# Patient Record
Sex: MALE | Race: OTHER | NOT HISPANIC OR LATINO
[De-identification: names, ages, dates, MRNs, and addresses within clinical notes are randomized per-mention and may not be internally consistent; named-entity substitution may affect disease eponyms.]

---

## 2017-01-26 ENCOUNTER — APPOINTMENT (OUTPATIENT)
Dept: SURGICAL ONCOLOGY | Facility: CLINIC | Age: 67
End: 2017-01-26

## 2017-01-26 VITALS
DIASTOLIC BLOOD PRESSURE: 95 MMHG | BODY MASS INDEX: 25.61 KG/M2 | WEIGHT: 150 LBS | HEART RATE: 77 BPM | SYSTOLIC BLOOD PRESSURE: 165 MMHG | HEIGHT: 64 IN

## 2017-02-28 ENCOUNTER — OUTPATIENT (OUTPATIENT)
Dept: OUTPATIENT SERVICES | Facility: HOSPITAL | Age: 67
LOS: 1 days | Discharge: HOME | End: 2017-02-28

## 2017-02-28 DIAGNOSIS — D49.9 NEOPLASM OF UNSPECIFIED BEHAVIOR OF UNSPECIFIED SITE: ICD-10-CM

## 2017-03-02 ENCOUNTER — APPOINTMENT (OUTPATIENT)
Dept: HEMATOLOGY ONCOLOGY | Facility: CLINIC | Age: 67
End: 2017-03-02

## 2017-03-14 ENCOUNTER — FORM ENCOUNTER (OUTPATIENT)
Age: 67
End: 2017-03-14

## 2017-03-15 ENCOUNTER — OUTPATIENT (OUTPATIENT)
Dept: OUTPATIENT SERVICES | Facility: HOSPITAL | Age: 67
LOS: 1 days | End: 2017-03-15
Payer: COMMERCIAL

## 2017-03-15 ENCOUNTER — APPOINTMENT (OUTPATIENT)
Dept: CT IMAGING | Facility: IMAGING CENTER | Age: 67
End: 2017-03-15

## 2017-03-15 DIAGNOSIS — C49.A0 GASTROINTESTINAL STROMAL TUMOR, UNSPECIFIED SITE: ICD-10-CM

## 2017-03-15 PROCEDURE — 74177 CT ABD & PELVIS W/CONTRAST: CPT

## 2017-03-15 PROCEDURE — 82565 ASSAY OF CREATININE: CPT

## 2017-04-06 ENCOUNTER — OUTPATIENT (OUTPATIENT)
Dept: OUTPATIENT SERVICES | Facility: HOSPITAL | Age: 67
LOS: 1 days | Discharge: HOME | End: 2017-04-06

## 2017-04-06 DIAGNOSIS — D49.9 NEOPLASM OF UNSPECIFIED BEHAVIOR OF UNSPECIFIED SITE: ICD-10-CM

## 2017-04-10 ENCOUNTER — APPOINTMENT (OUTPATIENT)
Dept: HEMATOLOGY ONCOLOGY | Facility: CLINIC | Age: 67
End: 2017-04-10

## 2017-04-10 VITALS
DIASTOLIC BLOOD PRESSURE: 90 MMHG | HEART RATE: 96 BPM | BODY MASS INDEX: 26.49 KG/M2 | WEIGHT: 154.32 LBS | RESPIRATION RATE: 16 BRPM | SYSTOLIC BLOOD PRESSURE: 160 MMHG | TEMPERATURE: 98.1 F | OXYGEN SATURATION: 96 %

## 2017-04-18 ENCOUNTER — APPOINTMENT (OUTPATIENT)
Dept: UROLOGY | Facility: CLINIC | Age: 67
End: 2017-04-18

## 2017-04-18 DIAGNOSIS — E11.9 TYPE 2 DIABETES MELLITUS W/OUT COMPLICATIONS: ICD-10-CM

## 2017-06-19 ENCOUNTER — MEDICATION RENEWAL (OUTPATIENT)
Age: 67
End: 2017-06-19

## 2017-07-18 ENCOUNTER — APPOINTMENT (OUTPATIENT)
Dept: UROLOGY | Facility: CLINIC | Age: 67
End: 2017-07-18

## 2017-07-27 ENCOUNTER — APPOINTMENT (OUTPATIENT)
Dept: SURGICAL ONCOLOGY | Facility: CLINIC | Age: 67
End: 2017-07-27

## 2017-08-21 ENCOUNTER — MEDICATION RENEWAL (OUTPATIENT)
Age: 67
End: 2017-08-21

## 2018-06-28 ENCOUNTER — RX RENEWAL (OUTPATIENT)
Age: 68
End: 2018-06-28

## 2018-06-28 ENCOUNTER — MEDICATION RENEWAL (OUTPATIENT)
Age: 68
End: 2018-06-28

## 2018-08-07 ENCOUNTER — APPOINTMENT (OUTPATIENT)
Dept: UROLOGY | Facility: CLINIC | Age: 68
End: 2018-08-07
Payer: MEDICARE

## 2018-08-07 VITALS
HEART RATE: 91 BPM | DIASTOLIC BLOOD PRESSURE: 89 MMHG | RESPIRATION RATE: 16 BRPM | SYSTOLIC BLOOD PRESSURE: 137 MMHG | TEMPERATURE: 97.9 F

## 2018-08-07 PROCEDURE — 99213 OFFICE O/P EST LOW 20 MIN: CPT

## 2018-08-14 ENCOUNTER — RX RENEWAL (OUTPATIENT)
Age: 68
End: 2018-08-14

## 2018-10-15 ENCOUNTER — APPOINTMENT (OUTPATIENT)
Dept: SURGICAL ONCOLOGY | Facility: CLINIC | Age: 68
End: 2018-10-15

## 2018-10-25 ENCOUNTER — OUTPATIENT (OUTPATIENT)
Dept: OUTPATIENT SERVICES | Facility: HOSPITAL | Age: 68
LOS: 1 days | Discharge: HOME | End: 2018-10-25

## 2018-10-25 DIAGNOSIS — D49.9 NEOPLASM OF UNSPECIFIED BEHAVIOR OF UNSPECIFIED SITE: ICD-10-CM

## 2018-11-02 ENCOUNTER — APPOINTMENT (OUTPATIENT)
Dept: HEMATOLOGY ONCOLOGY | Facility: CLINIC | Age: 68
End: 2018-11-02

## 2019-01-04 ENCOUNTER — MEDICATION RENEWAL (OUTPATIENT)
Age: 69
End: 2019-01-04

## 2019-08-15 ENCOUNTER — RX RENEWAL (OUTPATIENT)
Age: 69
End: 2019-08-15

## 2019-09-01 ENCOUNTER — OUTPATIENT (OUTPATIENT)
Dept: OUTPATIENT SERVICES | Facility: HOSPITAL | Age: 69
LOS: 1 days | End: 2019-09-01
Payer: MEDICARE

## 2019-09-01 PROCEDURE — G9001: CPT

## 2019-09-10 ENCOUNTER — INPATIENT (INPATIENT)
Facility: HOSPITAL | Age: 69
LOS: 2 days | Discharge: HOME | End: 2019-09-13
Attending: HOSPITALIST | Admitting: HOSPITALIST
Payer: MEDICARE

## 2019-09-10 VITALS
RESPIRATION RATE: 16 BRPM | TEMPERATURE: 99 F | DIASTOLIC BLOOD PRESSURE: 88 MMHG | SYSTOLIC BLOOD PRESSURE: 152 MMHG | OXYGEN SATURATION: 99 % | HEART RATE: 85 BPM

## 2019-09-10 DIAGNOSIS — I63.9 CEREBRAL INFARCTION, UNSPECIFIED: ICD-10-CM

## 2019-09-10 LAB
ALBUMIN SERPL ELPH-MCNC: 4.5 G/DL — SIGNIFICANT CHANGE UP (ref 3.3–5)
ALP SERPL-CCNC: 81 U/L — SIGNIFICANT CHANGE UP (ref 40–120)
ALT FLD-CCNC: 19 U/L — SIGNIFICANT CHANGE UP (ref 4–41)
ANION GAP SERPL CALC-SCNC: 11 MMO/L — SIGNIFICANT CHANGE UP (ref 7–14)
APTT BLD: 31.2 SEC — SIGNIFICANT CHANGE UP (ref 27.5–36.3)
AST SERPL-CCNC: 16 U/L — SIGNIFICANT CHANGE UP (ref 4–40)
BASE EXCESS BLDV CALC-SCNC: 1.7 MMOL/L — SIGNIFICANT CHANGE UP
BASOPHILS # BLD AUTO: 0.04 K/UL — SIGNIFICANT CHANGE UP (ref 0–0.2)
BASOPHILS NFR BLD AUTO: 0.4 % — SIGNIFICANT CHANGE UP (ref 0–2)
BILIRUB SERPL-MCNC: 0.4 MG/DL — SIGNIFICANT CHANGE UP (ref 0.2–1.2)
BLOOD GAS VENOUS - CREATININE: 0.8 MG/DL — SIGNIFICANT CHANGE UP (ref 0.5–1.3)
BUN SERPL-MCNC: 23 MG/DL — SIGNIFICANT CHANGE UP (ref 7–23)
CALCIUM SERPL-MCNC: 9.5 MG/DL — SIGNIFICANT CHANGE UP (ref 8.4–10.5)
CHLORIDE BLDV-SCNC: 108 MMOL/L — SIGNIFICANT CHANGE UP (ref 96–108)
CHLORIDE SERPL-SCNC: 103 MMOL/L — SIGNIFICANT CHANGE UP (ref 98–107)
CK MB BLD-MCNC: 4.32 NG/ML — SIGNIFICANT CHANGE UP (ref 1–6.6)
CK SERPL-CCNC: 256 U/L — HIGH (ref 30–200)
CO2 SERPL-SCNC: 24 MMOL/L — SIGNIFICANT CHANGE UP (ref 22–31)
CREAT SERPL-MCNC: 0.93 MG/DL — SIGNIFICANT CHANGE UP (ref 0.5–1.3)
EOSINOPHIL # BLD AUTO: 0.06 K/UL — SIGNIFICANT CHANGE UP (ref 0–0.5)
EOSINOPHIL NFR BLD AUTO: 0.6 % — SIGNIFICANT CHANGE UP (ref 0–6)
GAS PNL BLDV: 135 MMOL/L — LOW (ref 136–146)
GLUCOSE BLDV-MCNC: 116 MG/DL — HIGH (ref 70–99)
GLUCOSE SERPL-MCNC: 116 MG/DL — HIGH (ref 70–99)
HCO3 BLDV-SCNC: 25 MMOL/L — SIGNIFICANT CHANGE UP (ref 20–27)
HCT VFR BLD CALC: 44.6 % — SIGNIFICANT CHANGE UP (ref 39–50)
HCT VFR BLDV CALC: 44.9 % — SIGNIFICANT CHANGE UP (ref 39–51)
HGB BLD-MCNC: 14.1 G/DL — SIGNIFICANT CHANGE UP (ref 13–17)
HGB BLDV-MCNC: 14.7 G/DL — SIGNIFICANT CHANGE UP (ref 13–17)
IMM GRANULOCYTES NFR BLD AUTO: 0.2 % — SIGNIFICANT CHANGE UP (ref 0–1.5)
INR BLD: 1.05 — SIGNIFICANT CHANGE UP (ref 0.88–1.17)
LACTATE BLDV-MCNC: 1.1 MMOL/L — SIGNIFICANT CHANGE UP (ref 0.5–2)
LYMPHOCYTES # BLD AUTO: 2.41 K/UL — SIGNIFICANT CHANGE UP (ref 1–3.3)
LYMPHOCYTES # BLD AUTO: 23.9 % — SIGNIFICANT CHANGE UP (ref 13–44)
MCHC RBC-ENTMCNC: 25.9 PG — LOW (ref 27–34)
MCHC RBC-ENTMCNC: 31.6 % — LOW (ref 32–36)
MCV RBC AUTO: 82 FL — SIGNIFICANT CHANGE UP (ref 80–100)
MONOCYTES # BLD AUTO: 0.82 K/UL — SIGNIFICANT CHANGE UP (ref 0–0.9)
MONOCYTES NFR BLD AUTO: 8.1 % — SIGNIFICANT CHANGE UP (ref 2–14)
NEUTROPHILS # BLD AUTO: 6.72 K/UL — SIGNIFICANT CHANGE UP (ref 1.8–7.4)
NEUTROPHILS NFR BLD AUTO: 66.8 % — SIGNIFICANT CHANGE UP (ref 43–77)
NRBC # FLD: 0 K/UL — SIGNIFICANT CHANGE UP (ref 0–0)
PCO2 BLDV: 44 MMHG — SIGNIFICANT CHANGE UP (ref 41–51)
PH BLDV: 7.39 PH — SIGNIFICANT CHANGE UP (ref 7.32–7.43)
PLATELET # BLD AUTO: 215 K/UL — SIGNIFICANT CHANGE UP (ref 150–400)
PMV BLD: 10.7 FL — SIGNIFICANT CHANGE UP (ref 7–13)
PO2 BLDV: 40 MMHG — SIGNIFICANT CHANGE UP (ref 35–40)
POTASSIUM BLDV-SCNC: 3.9 MMOL/L — SIGNIFICANT CHANGE UP (ref 3.4–4.5)
POTASSIUM SERPL-MCNC: 4.1 MMOL/L — SIGNIFICANT CHANGE UP (ref 3.5–5.3)
POTASSIUM SERPL-SCNC: 4.1 MMOL/L — SIGNIFICANT CHANGE UP (ref 3.5–5.3)
PROT SERPL-MCNC: 7.5 G/DL — SIGNIFICANT CHANGE UP (ref 6–8.3)
PROTHROM AB SERPL-ACNC: 11.7 SEC — SIGNIFICANT CHANGE UP (ref 9.8–13.1)
RBC # BLD: 5.44 M/UL — SIGNIFICANT CHANGE UP (ref 4.2–5.8)
RBC # FLD: 14.9 % — HIGH (ref 10.3–14.5)
SAO2 % BLDV: 70.1 % — SIGNIFICANT CHANGE UP (ref 60–85)
SODIUM SERPL-SCNC: 138 MMOL/L — SIGNIFICANT CHANGE UP (ref 135–145)
TROPONIN T, HIGH SENSITIVITY: 11 NG/L — SIGNIFICANT CHANGE UP (ref ?–14)
WBC # BLD: 10.07 K/UL — SIGNIFICANT CHANGE UP (ref 3.8–10.5)
WBC # FLD AUTO: 10.07 K/UL — SIGNIFICANT CHANGE UP (ref 3.8–10.5)

## 2019-09-10 PROCEDURE — 70450 CT HEAD/BRAIN W/O DYE: CPT | Mod: 26,59

## 2019-09-10 PROCEDURE — 71045 X-RAY EXAM CHEST 1 VIEW: CPT | Mod: 26

## 2019-09-10 PROCEDURE — 70498 CT ANGIOGRAPHY NECK: CPT | Mod: 26

## 2019-09-10 PROCEDURE — 70496 CT ANGIOGRAPHY HEAD: CPT | Mod: 26

## 2019-09-10 RX ORDER — HEPARIN SODIUM 5000 [USP'U]/ML
5000 INJECTION INTRAVENOUS; SUBCUTANEOUS THREE TIMES A DAY
Refills: 0 | Status: DISCONTINUED | OUTPATIENT
Start: 2019-09-10 | End: 2019-09-13

## 2019-09-10 RX ORDER — DEXTROSE 50 % IN WATER 50 %
25 SYRINGE (ML) INTRAVENOUS ONCE
Refills: 0 | Status: DISCONTINUED | OUTPATIENT
Start: 2019-09-10 | End: 2019-09-13

## 2019-09-10 RX ORDER — SODIUM CHLORIDE 9 MG/ML
1000 INJECTION, SOLUTION INTRAVENOUS
Refills: 0 | Status: DISCONTINUED | OUTPATIENT
Start: 2019-09-10 | End: 2019-09-13

## 2019-09-10 RX ORDER — INSULIN LISPRO 100/ML
VIAL (ML) SUBCUTANEOUS
Refills: 0 | Status: DISCONTINUED | OUTPATIENT
Start: 2019-09-10 | End: 2019-09-13

## 2019-09-10 RX ORDER — LOSARTAN POTASSIUM 100 MG/1
0 TABLET, FILM COATED ORAL
Qty: 90 | Refills: 0 | DISCHARGE

## 2019-09-10 RX ORDER — INSULIN LISPRO 100/ML
VIAL (ML) SUBCUTANEOUS AT BEDTIME
Refills: 0 | Status: DISCONTINUED | OUTPATIENT
Start: 2019-09-10 | End: 2019-09-13

## 2019-09-10 RX ORDER — DEXTROSE 50 % IN WATER 50 %
12.5 SYRINGE (ML) INTRAVENOUS ONCE
Refills: 0 | Status: DISCONTINUED | OUTPATIENT
Start: 2019-09-10 | End: 2019-09-13

## 2019-09-10 RX ORDER — ASPIRIN/CALCIUM CARB/MAGNESIUM 324 MG
325 TABLET ORAL ONCE
Refills: 0 | Status: COMPLETED | OUTPATIENT
Start: 2019-09-10 | End: 2019-09-10

## 2019-09-10 RX ORDER — DEXTROSE 50 % IN WATER 50 %
15 SYRINGE (ML) INTRAVENOUS ONCE
Refills: 0 | Status: DISCONTINUED | OUTPATIENT
Start: 2019-09-10 | End: 2019-09-13

## 2019-09-10 RX ORDER — GLUCAGON INJECTION, SOLUTION 0.5 MG/.1ML
1 INJECTION, SOLUTION SUBCUTANEOUS ONCE
Refills: 0 | Status: DISCONTINUED | OUTPATIENT
Start: 2019-09-10 | End: 2019-09-13

## 2019-09-10 RX ADMIN — Medication 325 MILLIGRAM(S): at 22:08

## 2019-09-10 NOTE — ED ADULT NURSE NOTE - OBJECTIVE STATEMENT
70 yo M AAOx4 received to [facility] as a Code Stroke: pt reports sudden onset dizziness while at work around 0430 and states he was "favoring his left side, felt like he was walking differently," denies new changes in vision (hx cataracts), no slurred speech or facial asymmetry observed, gait even and steady, b/l pupils reactive, b/l UE and LE strength/sensation intact, hx HTN, DM, triple bypass and "minor stroke a few years back" (unable to elaborate), 18G placed to RAC by SAMANTHA Han in [place], labs sent, pt placed on CM showing NSR, additional VS as charted, MD Cabot and neuro resident at bedside, will monitor

## 2019-09-10 NOTE — CONSULT NOTE ADULT - SUBJECTIVE AND OBJECTIVE BOX
Neurology  Consult Note  09-10-19    Name:  ASHLEE PEGUERO; 69y (1950)    Reason for Admission:     Chief Complaint:  HPI: 70yo Rt handed male with CABG, GI tumor s/p resection, Rt knee arthritis having ataxic gait since 2 days; having    Review of Systems:  As states in HPI.        PMHx:   Syncope and collapse  Neoplasm  Hypercholesterolemia  Vertigo  TIA (transient ischemic attack)  CAD (coronary artery disease)  Diabetes  Hypertension    PFHx:   Family history of heart disease (Aunt)  Family history of heart failure (Sibling)    PSuHx:   Gastric mass  History of appendectomy  S/P angioplasty  S/P CABG x 3    PSoHx:     Marital Status:  (   )    (   ) Single    (   )    (  )   Occupation:   Lives with: (  ) alone  (  ) children   (  ) spouse   (  ) parents  (  ) other  Recent Travel:     Substance Use (street drugs): (  ) never used  (  ) other:  Tobacco Usage:  (   ) never smoked   (   ) former smoker   (   ) current smoker  (     ) pack year  Alcohol Usage:  Sexual History:         Medications:  MEDICATIONS  (STANDING):    MEDICATIONS  (PRN):    Vitals:  T(C): 37 (09-10-19 @ 20:46), Max: 37 (09-10-19 @ 20:46)  HR: 85 (09-10-19 @ 21:43) (85 - 85)  BP: 196/100 (09-10-19 @ 21:43) (152/88 - 196/100)  RR: 18 (09-10-19 @ 21:43) (16 - 18)  SpO2: 100% (09-10-19 @ 21:43) (99% - 100%)    Labs:                        14.1   10.07 )-----------( 215      ( 10 Sep 2019 21:15 )             44.6     09-10    138  |  103  |  23  ----------------------------<  116<H>  4.1   |  24  |  0.93    Ca    9.5      10 Sep 2019 21:15    TPro  7.5  /  Alb  4.5  /  TBili  0.4  /  DBili  x   /  AST  16  /  ALT  19  /  AlkPhos  81  09-10    CAPILLARY BLOOD GLUCOSE  109 (10 Sep 2019 21:18)      POCT Blood Glucose.: 108 mg/dL (10 Sep 2019 21:53)    LIVER FUNCTIONS - ( 10 Sep 2019 21:15 )  Alb: 4.5 g/dL / Pro: 7.5 g/dL / ALK PHOS: 81 u/L / ALT: 19 u/L / AST: 16 u/L / GGT: x             PT/INR - ( 10 Sep 2019 21:15 )   PT: 11.7 SEC;   INR: 1.05          PTT - ( 10 Sep 2019 21:15 )  PTT:31.2 SEC  CSF:                      PHYSICAL EXAMINATION:  General: Well-developed, well nourished, in no acute distress.  Eyes: Conjunctiva and sclera clear.  Neurologic:  - Mental Status:  Alert, awake, oriented to person, place, and time; Speech is fluent with intact naming, repetition, and comprehension; Good overall fund of knowledge; Immediate recall is 3/3 words and delayed recall is 3/3 words at 5 minutes; Able to spell WORLD backwards and perform serial 7 subtraction; Able to read and write a sentence.  - Cranial Nerves II-XII:    II:  Visual fields are full to confrontation; Pupils are equal, round, and reactive to light; Visual acuity is 20/20 bilaterally; Fundoscopic exam is normal with sharp discs.  III, IV, VI:  Extraocular movements are intact without nystagmus.  V:  Facial sensation is intact in the V1-V3 distribution bilaterally.  VII:  Face is symmetric with normal eye closure and smile  VIII:  Hearing is intact to finger rub.  IX, X:  Uvula is midline and soft palate rises symmetrically  XI:  Head turning and shoulder shrug are intact.  XII:  Tongue protudes in the midline.  - Motor:  Strength is 5/5 throughout.  There is no pronator drift.  Normal muscle bulk and tone throughout.  - Reflexes:  2+ and symmetric at the biceps, triceps, brachioradialis, knees, and ankles.  Plantar responses flexor.  - Sensory:  Intact throughout to vibration, and joint-position, light touch, pin prick.  - Coordination:  Finger-nose-finger and heel-knee-shin intact without dysmetria.  Rapid alternating hand movements intact.  - Gait:   Normal steps, base, arm swing, and turning.  Heel and toe walking are normal.  Tandem gait is normal.  Romberg testing is negative.    Radiology:      Impression:      Plan:    Legend:  [] Uncomplete task.  [P] Ordered and pending task.  [R] Imaging done, pending read.    [x] Completed task. Neurology  Consult Note  09-10-19    Name:  ASHLEE PEGUERO; 69y (1950)    Reason for Admission:     Chief Complaint:  HPI: 68yo Rt handed male with CABG, GI tumor s/p resection, Rt knee arthritis having ataxic gait since 2 days; having difficulty walking since 2 days; increased since 430pm on 09.10.2019. patient was signing out from work when he suddenly felt as if he was bending towards left side. He slowly walked back home and called his son who brought him to the ED. No weakness endorsed by the patient.     ROS: + nausea, no vomiting, no seizures, no loss of vision, no LOC    Underwent cataract surgery recently.    Review of Systems:  As states in HPI.    Pertinent medical conditions: CAD s/p CABG 15 y ago, HFpEF s/p gastric mass resection in early June 2015, HTN, PreDM; urinary retention (BPH)    PMHx:   Syncope and collapse  Neoplasm  Hypercholesterolemia  Vertigo  TIA (transient ischemic attack)  CAD (coronary artery disease)  Diabetes  Hypertension    PFHx:   Family history of heart disease (Aunt)  Family history of heart failure (Sibling)    PSuHx:   Gastric mass  History of appendectomy  S/P angioplasty  S/P CABG x 3    PSoHx:     Marital Status:  (   )    (   ) Single    (   )    (  )   Occupation:   Lives with: (  ) alone  (  ) children   (  ) spouse   (  ) parents  (  ) other  Recent Travel:     Substance Use (street drugs): (  ) never used  (  ) other:  Tobacco Usage:  (   ) never smoked   (   ) former smoker   (   ) current smoker  (     ) pack year  Alcohol Usage:  Sexual History:         Medications:  MEDICATIONS  (STANDING):    MEDICATIONS  (PRN):    Vitals:  T(C): 37 (09-10-19 @ 20:46), Max: 37 (09-10-19 @ 20:46)  HR: 85 (09-10-19 @ 21:43) (85 - 85)  BP: 196/100 (09-10-19 @ 21:43) (152/88 - 196/100)  RR: 18 (09-10-19 @ 21:43) (16 - 18)  SpO2: 100% (09-10-19 @ 21:43) (99% - 100%)    Labs:                        14.1   10.07 )-----------( 215      ( 10 Sep 2019 21:15 )             44.6     09-10    138  |  103  |  23  ----------------------------<  116<H>  4.1   |  24  |  0.93    Ca    9.5      10 Sep 2019 21:15    TPro  7.5  /  Alb  4.5  /  TBili  0.4  /  DBili  x   /  AST  16  /  ALT  19  /  AlkPhos  81  09-10    CAPILLARY BLOOD GLUCOSE  109 (10 Sep 2019 21:18)      POCT Blood Glucose.: 108 mg/dL (10 Sep 2019 21:53)    LIVER FUNCTIONS - ( 10 Sep 2019 21:15 )  Alb: 4.5 g/dL / Pro: 7.5 g/dL / ALK PHOS: 81 u/L / ALT: 19 u/L / AST: 16 u/L / GGT: x             PT/INR - ( 10 Sep 2019 21:15 )   PT: 11.7 SEC;   INR: 1.05          PTT - ( 10 Sep 2019 21:15 )  PTT:31.2 SEC  CSF:                      PHYSICAL EXAMINATION:  General: in no acute distress.  Eyes: Conjunctiva and sclera clear.  Neurologic:  - Mental Status:  Alert, awake, oriented to person, place, and time; Speech is baseline for patient; with intact naming, repetition, and comprehension;  - Cranial Nerves II-XII:    II:  Visual fields are full to confrontation; Pupils are equal, round, and reactive to light; Visual acuity is 20/20 bilaterally;   III, IV, VI:  Extraocular movements are intact without nystagmus.  V:  Facial sensation is intact in the V1-V3 distribution bilaterally.  VII:  Mild rt nasolabial flattening  VIII:  Hearing is intact to finger rub.  IX, X:  Uvula is midline and soft palate rises symmetrically  XI:  Head turning and shoulder shrug are intact.  XII:  Tongue protudes in the midline.  - Motor:  Strength is 5/5 throughout (subtle weakness Rt UE and Rt LE).  Mild pronator drift Rt UE and Rt LE.  Normal muscle bulk and tone throughout.  - Reflexes:  2+ and symmetric at the biceps, triceps, brachioradialis, knees, and ankles.  Plantar responses flexor.  - Sensory:  Intact throughout to vibration, and joint-position, light touch, pin prick.  - Coordination:  Finger-nose-finger and heel-knee-shin intact without dysmetria.  Rapid alternating hand movements intact.  - Gait:   Normal steps, base, arm swing, and turning.  Heel and toe walking are normal.  Tandem gait is normal.  Romberg testing is negative.    CTA BRAIN: Patent intracranial circulation. No flow-limiting stenosis or occlusion.   CTA NECK: Patent cervical vasculature. No flow limiting stenosis or occlusion. Severe atherosclerotic disease involving the proximal right vertebral artery near the basilar tip.       Non-contrast CT head: No CT evidence of an acute territorial infarct. No acute intracranial bleeding, mass effect, or shift. Neurology  Consult Note  09-10-19    Name:  ASHLEE PEGUERO; 69y (1950)    Reason for Admission:     Chief Complaint:  HPI: 70yo Rt handed male with CABG, GI tumor s/p resection, Rt knee arthritis having antalgic gait since 2 days 2/2 rt knee arthritis; per the son patient has a baseline lt sided limp; symptoms increased since 430pm on 09.10.2019 when patient was signing out from work when he suddenly felt as if he was bending towards left side; sudden worsening of symptoms were attributed to stroke.  Patient slowly walked back home and called his son who brought him to the ED. No weakness endorsed by the patient.     ROS: + nausea, no vomiting, no seizures, no loss of vision, no LOC    Underwent cataract surgery recently.    Review of Systems:  As states in HPI.    Pertinent medical conditions: CAD s/p CABG 15 y ago, HFpEF s/p gastric mass resection in early June 2015, HTN, PreDM; urinary retention (BPH)    PMHx:   Syncope and collapse  Neoplasm  Hypercholesterolemia  Vertigo  TIA (transient ischemic attack)  CAD (coronary artery disease)  Diabetes  Hypertension    PFHx:   Family history of heart disease (Aunt)  Family history of heart failure (Sibling)    PSuHx:   Gastric mass  History of appendectomy  S/P angioplasty  S/P CABG x 3    Medications:  MEDICATIONS  (STANDING):    MEDICATIONS  (PRN):    Vitals:  T(C): 37 (09-10-19 @ 20:46), Max: 37 (09-10-19 @ 20:46)  HR: 85 (09-10-19 @ 21:43) (85 - 85)  BP: 196/100 (09-10-19 @ 21:43) (152/88 - 196/100)  RR: 18 (09-10-19 @ 21:43) (16 - 18)  SpO2: 100% (09-10-19 @ 21:43) (99% - 100%)    Labs:                        14.1   10.07 )-----------( 215      ( 10 Sep 2019 21:15 )             44.6     09-10    138  |  103  |  23  ----------------------------<  116<H>  4.1   |  24  |  0.93    Ca    9.5      10 Sep 2019 21:15    TPro  7.5  /  Alb  4.5  /  TBili  0.4  /  DBili  x   /  AST  16  /  ALT  19  /  AlkPhos  81  09-10    CAPILLARY BLOOD GLUCOSE  109 (10 Sep 2019 21:18)      POCT Blood Glucose.: 108 mg/dL (10 Sep 2019 21:53)    LIVER FUNCTIONS - ( 10 Sep 2019 21:15 )  Alb: 4.5 g/dL / Pro: 7.5 g/dL / ALK PHOS: 81 u/L / ALT: 19 u/L / AST: 16 u/L / GGT: x             PT/INR - ( 10 Sep 2019 21:15 )   PT: 11.7 SEC;   INR: 1.05          PTT - ( 10 Sep 2019 21:15 )  PTT:31.2 SEC  CSF:      PHYSICAL EXAMINATION:  General: in no acute distress.  Eyes: Conjunctiva and sclera clear.  Neurologic:  - Mental Status:  Alert, awake, oriented to person, place, and time; Speech is baseline for patient; with intact naming, repetition, and comprehension;  - Cranial Nerves II-XII:    II:  Visual fields are full to confrontation; Pupils are equal, round, and reactive to light; Visual acuity is 20/20 bilaterally;   III, IV, VI:  Extraocular movements are intact without nystagmus.  V:  Facial sensation is intact in the V1-V3 distribution bilaterally.  VII:  Mild rt nasolabial flattening  VIII:  Hearing is intact to finger rub.  IX, X:  Uvula is midline and soft palate rises symmetrically  XI:  Head turning and shoulder shrug are intact.  XII:  Tongue protudes in the midline.  - Motor:  Strength is 5/5 throughout (subtle weakness Rt UE and Rt LE).  Mild pronator drift Rt UE and Rt LE.  Normal muscle bulk and tone throughout.  - Reflexes:  2+ and symmetric at the biceps, triceps, brachioradialis, knees, and ankles.  Plantar responses flexor.  - Sensory:  Intact throughout to vibration, and joint-position, light touch, pin prick.  - Coordination:  Finger-nose-finger and heel-knee-shin intact without dysmetria.  Rapid alternating hand movements intact.  - Gait:   Normal steps, base, arm swing, and turning.  Heel and toe walking are normal.  Tandem gait is normal.  Romberg testing is negative.    CTA BRAIN: Patent intracranial circulation. No flow-limiting stenosis or occlusion.   CTA NECK: Patent cervical vasculature. No flow limiting stenosis or occlusion. Severe atherosclerotic disease involving the proximal right vertebral artery near the basilar tip.       Non-contrast CT head: No CT evidence of an acute territorial infarct. No acute intracranial bleeding, mass effect, or shift.

## 2019-09-10 NOTE — ED ADULT TRIAGE NOTE - CHIEF COMPLAINT QUOTE
Pt st" I can't walk feel like I will fall over...I don't feel dizzy  and I can't hold pen in rt hand ."

## 2019-09-10 NOTE — CHART NOTE - NSCHARTNOTEFT_GEN_A_CORE
Resident: Enzo Kunz - Called by Triage RN to eval patient for possible code stroke. Pt states has hx of cardiac stents. 4 hours ago says that started having difficulty walking. He describes difficulty as feeling like he is falling over to the side. Gross strength and sensation intact in UE and LE. When attempting to ambulate pt appears to have difficulty balancing and moving his right leg. Pt states that he ambulates normally without difficulty. Advised to call code stroke.

## 2019-09-10 NOTE — ED PROVIDER NOTE - ATTENDING CONTRIBUTION TO CARE
I, Jennifer Cabot, MD, have performed a history and physical exam of the patient and discussed their management with the resident. I reviewed the resident's note and agree with the documented findings and plan of care. My medical decision making and observations are found above.    Cabot: 69M with PMH of HTN, HL, DM, CAD s/p quad CABG, prior silent CVAs, here with 2d of R hand weakness and 5 hours of ataxia.  No F/C/N/V/D/urinary sx/CP/SOB.  HDS, NAD, MMM, eyes clear, lungs CTAB, heart sounds normal, abd soft, NT, ND, no CVAT, LEs without edema, wwp, skin normal temperature and color, neuro: AAOx3, PERRLA, EOMIB, CN 2-12 intact, 4/5 R hand  strength and RLE strength, SILT, + RUE drift, ataxic gait, falling to R.  Very likely CVA.  Will admit for MRI brain and further workup.

## 2019-09-10 NOTE — ED PROVIDER NOTE - CLINICAL SUMMARY MEDICAL DECISION MAKING FREE TEXT BOX
Cabot: 69M with PMH of HTN, HL, DM, CAD s/p quad CABG, prior silent CVAs, here with 2d of R hand weakness and 5 hours of ataxia.  No F/C/N/V/D/urinary sx/CP/SOB.  HDS, NAD, MMM, eyes clear, lungs CTAB, heart sounds normal, abd soft, NT, ND, no CVAT, LEs without edema, wwp, skin normal temperature and color, neuro: AAOx3, PERRLA, EOMIB, CN 2-12 intact, 4/5 R hand  strength and RLE strength, SILT, + RUE drift, ataxic gait, falling to R.  Very likely CVA.  Will admit for MRI brain and further workup.

## 2019-09-10 NOTE — ED PROVIDER NOTE - PHYSICAL EXAMINATION
HDS, NAD, MMM, eyes clear, lungs CTAB, heart sounds normal, abd soft, NT, ND, no CVAT, LEs without edema, wwp, skin normal temperature and color, neuro: AAOx3, PERRLA, EOMIB, CN 2-12 intact, 4/5 R hand  strength and RLE strength, SILT, + RUE drift, ataxic gait, falling to R

## 2019-09-10 NOTE — ED PROVIDER NOTE - OBJECTIVE STATEMENT
Cabot: 69M with PMH of HTN, HL, DM, CAD s/p quad CABG, prior silent CVAs, here with 2d of R hand weakness and 5 hours of ataxia.  No F/C/N/V/D/urinary sx/CP/SOB. Cabot: 69M with PMH of HTN, HL, DM, CAD s/p quad CABG, prior silent CVAs, here with 2d of R hand weakness and 5 hours of ataxia.  No F/C/N/V/D/urinary sx/CP/SOB.    PCP: Sai  Cardiologist: Dr. Gleason

## 2019-09-10 NOTE — ED PROVIDER NOTE - PMH
CAD (coronary artery disease)    Diabetes    Hypercholesterolemia    Hypertension    Neoplasm  abdominal  Syncope and collapse  6/2015  TIA (transient ischemic attack)  March 2015- was admitted at [facility]

## 2019-09-10 NOTE — ED PROVIDER NOTE - PROGRESS NOTE DETAILS
PGY2/MD Makeda. No new symotoms. CT negative to bleeding, MRI recommended. pt stands up, urinating with no off balance. no chest pain or sob. TIA vs. stoke. aspirin PO at this moment, no heparin recommended from neuro. admission to hospitalist, accepted.

## 2019-09-10 NOTE — CONSULT NOTE ADULT - ASSESSMENT
68yo Rt handed male with CABG, GI tumor s/p resection, Rt knee arthritis having ataxic gait since 2 days; symptomatic worsening since 430pm on 09.10.2019. Last known normal was 2 days ago. Associated nausea; no weakness; other ROS are negative; neurologic exam reveals mild pronator drift Rt UE and LE, rt nasolabial flattening and subtle decrease in strength Rt UE; no cerebellar signs; rhomberg neg; symptoms resolved at this point; does not endorse weakness anywhere; NCCT head reveals previous lacunar infarcts; atherosclerotic disease noted on CT angio however no significant stenosis. NIHSS3; MRS 0    Impression: Lt sided stroke; 68yo Rt handed male with CABG, GI tumor s/p resection, Rt knee arthritis having ataxic gait since 2 days; symptomatic worsening since 430pm on 09.10.2019. Last known normal was 2 days ago. Associated nausea; no weakness; other ROS are negative; neurologic exam reveals mild pronator drift Rt UE and LE, rt nasolabial flattening and subtle decrease in strength Rt UE; no cerebellar signs; rhomberg neg; symptoms resolved at this point; does not endorse weakness anywhere; NCCT head reveals previous lacunar infarcts; atherosclerotic disease noted on CT angio however no significant stenosis. NIHSS3; MRS 0    Impression: Lt sided stroke;     Recommendations:  - MRI without contrast brain  - Permissive HTN  - Continue current aspirin dose 81mg daily  - PT/OT 70yo Rt handed male with CABG, GI tumor s/p resection, Rt knee arthritis having ataxic gait since 2 days; symptomatic worsening since 430pm on 09.10.2019. Last known normal was 2 days ago. Associated nausea; no weakness; other ROS are negative; neurologic exam reveals mild pronator drift Rt UE and LE, rt nasolabial flattening and subtle decrease in strength Rt UE; no cerebellar signs; rhomberg neg; symptoms resolved at this point; does not endorse weakness anywhere; NCCT head reveals previous lacunar infarcts; severe atherosclerosis noted tip of rt basilar artery. 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 Last known normal was 2 days ago. Associated nausea; no weakness; other ROS are negative; neurologic exam reveals mild pronator drift Rt UE and LE, rt nasolabial flattening and subtle decrease in strength Rt UE; no cerebellar signs; rhomberg neg; symptoms resolved at this point; does not endorse weakness anywhere; NCCT head reveals previous lacunar infarcts; severe atherosclerosis noted tip of rt basilar artery. NIHSS3; MRS 0    Impression: R Hemiparesis, Pure motor lacunar syndrome, likely L hemispheric dysfunction, etiology unknown but maybe small vessel disease    []  Aspirin 81   []  Atorvastatin 80, titrate to LDL<70  []  MRI brain w/o contrast   []  TTE with bubble study and telemetry to look for a cardiac source of embolism.   []  DVT prophylaxis   []  Telemonitoring;  Neurochecks q4h  []  Permissive HTN up to 220/120 mmHg for first 24 hours after the symptom onset followed by gradual normotension.   []  HbA1C and Lipid Panel  []  PT/OT evaluation  []  NPO until clears Dysphagia screen, otherwise Swallow evaluation  []  Stroke education provided 68yo Rt handed male with CABG, GI tumor s/p resection, Rt knee arthritis having abnormal gait since 2 days; symptomatic worsening since 430pm on 09.10.2019. Last known normal was 2 days ago. Associated nausea; no weakness; other ROS are negative; neurologic exam reveals mild pronator drift Rt UE and LE, rt nasolabial flattening and subtle decrease in strength Rt UE; no cerebellar signs; rhomberg neg; does not endorse weakness anywhere; NCCT head reveals previous lacunar infarcts; severe atherosclerosis noted in proximal vertebral artery at the tip of basilar. Currently outside the window for TPA. NIHSS3; MRS 0    Impression: R Hemiparesis, Pure motor lacunar syndrome, likely L hemispheric dysfunction, etiology unknown but maybe small vessel disease    []  Aspirin 81   []  Atorvastatin 80, titrate to LDL<70  []  MRI brain w/o contrast   []  TTE with bubble study and telemetry to look for a cardiac source of embolism.   []  DVT prophylaxis   []  Telemonitoring;  Neurochecks q4h  []  Permissive HTN up to 220/120 mmHg for first 24 hours after the symptom onset followed by gradual normotension.   []  HbA1C and Lipid Panel  []  PT/OT evaluation  []  NPO until clears Dysphagia screen, otherwise Swallow evaluation  []  Stroke education provided 70yo Rt handed male with CABG, GI tumor s/p resection, Rt knee arthritis having abnormal gait since 2 days; symptomatic worsening since 430pm on 09.10.2019. Last known normal was 2 days ago. Associated nausea; no weakness; other ROS are negative; neurologic exam reveals mild pronator drift Rt UE and LE, rt nasolabial flattening and subtle decrease in strength Rt UE; no cerebellar signs; rhomberg neg; does not endorse weakness anywhere; NCCT head reveals previous lacunar infarcts; severe atherosclerosis noted in proximal vertebral artery at the tip of basilar. Currently outside the window for TPA. NIHSS3; MRS 0    Impression: R Hemiparesis,  likely L brain dysfunction, etiology unknown but maybe small vessel disease    []  Aspirin 81   []  Atorvastatin 80, titrate to LDL<70  []  MRI brain w/o contrast   []  TTE with bubble study and telemetry to look for a cardiac source of embolism.   []  DVT prophylaxis   []  Telemonitoring;  Neurochecks q4h  []  Permissive HTN up to 220/120 mmHg for first 24 hours after the symptom onset followed by gradual normotension.   []  HbA1C and Lipid Panel  []  PT/OT evaluation  []  NPO until clears Dysphagia screen, otherwise Swallow evaluation  []  Stroke education provided

## 2019-09-11 DIAGNOSIS — Z29.9 ENCOUNTER FOR PROPHYLACTIC MEASURES, UNSPECIFIED: ICD-10-CM

## 2019-09-11 DIAGNOSIS — E11.9 TYPE 2 DIABETES MELLITUS WITHOUT COMPLICATIONS: ICD-10-CM

## 2019-09-11 DIAGNOSIS — E78.00 PURE HYPERCHOLESTEROLEMIA, UNSPECIFIED: ICD-10-CM

## 2019-09-11 DIAGNOSIS — I25.10 ATHEROSCLEROTIC HEART DISEASE OF NATIVE CORONARY ARTERY WITHOUT ANGINA PECTORIS: ICD-10-CM

## 2019-09-11 DIAGNOSIS — I63.9 CEREBRAL INFARCTION, UNSPECIFIED: ICD-10-CM

## 2019-09-11 DIAGNOSIS — I10 ESSENTIAL (PRIMARY) HYPERTENSION: ICD-10-CM

## 2019-09-11 LAB
ANION GAP SERPL CALC-SCNC: 14 MMO/L — SIGNIFICANT CHANGE UP (ref 7–14)
BASOPHILS # BLD AUTO: 0.02 K/UL — SIGNIFICANT CHANGE UP (ref 0–0.2)
BASOPHILS NFR BLD AUTO: 0.3 % — SIGNIFICANT CHANGE UP (ref 0–2)
BLD GP AB SCN SERPL QL: NEGATIVE — SIGNIFICANT CHANGE UP
BUN SERPL-MCNC: 15 MG/DL — SIGNIFICANT CHANGE UP (ref 7–23)
CALCIUM SERPL-MCNC: 9.3 MG/DL — SIGNIFICANT CHANGE UP (ref 8.4–10.5)
CHLORIDE SERPL-SCNC: 101 MMOL/L — SIGNIFICANT CHANGE UP (ref 98–107)
CHOLEST SERPL-MCNC: 138 MG/DL — SIGNIFICANT CHANGE UP (ref 120–199)
CHOLEST SERPL-MCNC: 139 MG/DL — SIGNIFICANT CHANGE UP (ref 120–199)
CK MB BLD-MCNC: 0.6 — SIGNIFICANT CHANGE UP (ref 0–2.5)
CK MB BLD-MCNC: 3.44 NG/ML — SIGNIFICANT CHANGE UP (ref 1–6.6)
CK SERPL-CCNC: 610 U/L — HIGH (ref 30–200)
CO2 SERPL-SCNC: 27 MMOL/L — SIGNIFICANT CHANGE UP (ref 22–31)
CREAT SERPL-MCNC: 0.77 MG/DL — SIGNIFICANT CHANGE UP (ref 0.5–1.3)
EOSINOPHIL # BLD AUTO: 0.06 K/UL — SIGNIFICANT CHANGE UP (ref 0–0.5)
EOSINOPHIL NFR BLD AUTO: 0.9 % — SIGNIFICANT CHANGE UP (ref 0–6)
GLUCOSE BLDC GLUCOMTR-MCNC: 103 MG/DL — HIGH (ref 70–99)
GLUCOSE BLDC GLUCOMTR-MCNC: 105 MG/DL — HIGH (ref 70–99)
GLUCOSE BLDC GLUCOMTR-MCNC: 89 MG/DL — SIGNIFICANT CHANGE UP (ref 70–99)
GLUCOSE SERPL-MCNC: 123 MG/DL — HIGH (ref 70–99)
HBA1C BLD-MCNC: 6.3 % — HIGH (ref 4–5.6)
HBA1C BLD-MCNC: 6.4 % — HIGH (ref 4–5.6)
HCT VFR BLD CALC: 45.9 % — SIGNIFICANT CHANGE UP (ref 39–50)
HCV AB S/CO SERPL IA: 0.17 S/CO — SIGNIFICANT CHANGE UP (ref 0–0.99)
HCV AB SERPL-IMP: SIGNIFICANT CHANGE UP
HDLC SERPL-MCNC: 74 MG/DL — HIGH (ref 35–55)
HDLC SERPL-MCNC: 74 MG/DL — HIGH (ref 35–55)
HGB BLD-MCNC: 14.2 G/DL — SIGNIFICANT CHANGE UP (ref 13–17)
IMM GRANULOCYTES NFR BLD AUTO: 0.3 % — SIGNIFICANT CHANGE UP (ref 0–1.5)
LIPID PNL WITH DIRECT LDL SERPL: 57 MG/DL — SIGNIFICANT CHANGE UP
LIPID PNL WITH DIRECT LDL SERPL: 63 MG/DL — SIGNIFICANT CHANGE UP
LYMPHOCYTES # BLD AUTO: 1.9 K/UL — SIGNIFICANT CHANGE UP (ref 1–3.3)
LYMPHOCYTES # BLD AUTO: 29.3 % — SIGNIFICANT CHANGE UP (ref 13–44)
MAGNESIUM SERPL-MCNC: 2 MG/DL — SIGNIFICANT CHANGE UP (ref 1.6–2.6)
MCHC RBC-ENTMCNC: 25.2 PG — LOW (ref 27–34)
MCHC RBC-ENTMCNC: 30.9 % — LOW (ref 32–36)
MCV RBC AUTO: 81.4 FL — SIGNIFICANT CHANGE UP (ref 80–100)
MONOCYTES # BLD AUTO: 0.56 K/UL — SIGNIFICANT CHANGE UP (ref 0–0.9)
MONOCYTES NFR BLD AUTO: 8.6 % — SIGNIFICANT CHANGE UP (ref 2–14)
NEUTROPHILS # BLD AUTO: 3.93 K/UL — SIGNIFICANT CHANGE UP (ref 1.8–7.4)
NEUTROPHILS NFR BLD AUTO: 60.6 % — SIGNIFICANT CHANGE UP (ref 43–77)
NRBC # FLD: 0 K/UL — SIGNIFICANT CHANGE UP (ref 0–0)
PHOSPHATE SERPL-MCNC: 3.7 MG/DL — SIGNIFICANT CHANGE UP (ref 2.5–4.5)
PLATELET # BLD AUTO: 211 K/UL — SIGNIFICANT CHANGE UP (ref 150–400)
PMV BLD: 10.7 FL — SIGNIFICANT CHANGE UP (ref 7–13)
POTASSIUM SERPL-MCNC: 4.1 MMOL/L — SIGNIFICANT CHANGE UP (ref 3.5–5.3)
POTASSIUM SERPL-SCNC: 4.1 MMOL/L — SIGNIFICANT CHANGE UP (ref 3.5–5.3)
RBC # BLD: 5.64 M/UL — SIGNIFICANT CHANGE UP (ref 4.2–5.8)
RBC # FLD: 14.9 % — HIGH (ref 10.3–14.5)
RH IG SCN BLD-IMP: POSITIVE — SIGNIFICANT CHANGE UP
SODIUM SERPL-SCNC: 142 MMOL/L — SIGNIFICANT CHANGE UP (ref 135–145)
TRIGL SERPL-MCNC: 54 MG/DL — SIGNIFICANT CHANGE UP (ref 10–149)
TRIGL SERPL-MCNC: 58 MG/DL — SIGNIFICANT CHANGE UP (ref 10–149)
TROPONIN T, HIGH SENSITIVITY: 12 NG/L — SIGNIFICANT CHANGE UP (ref ?–14)
WBC # BLD: 6.49 K/UL — SIGNIFICANT CHANGE UP (ref 3.8–10.5)
WBC # FLD AUTO: 6.49 K/UL — SIGNIFICANT CHANGE UP (ref 3.8–10.5)

## 2019-09-11 PROCEDURE — 33285 INSJ SUBQ CAR RHYTHM MNTR: CPT

## 2019-09-11 PROCEDURE — 99222 1ST HOSP IP/OBS MODERATE 55: CPT

## 2019-09-11 PROCEDURE — 70551 MRI BRAIN STEM W/O DYE: CPT | Mod: 26

## 2019-09-11 PROCEDURE — 12345: CPT | Mod: NC

## 2019-09-11 PROCEDURE — 99223 1ST HOSP IP/OBS HIGH 75: CPT

## 2019-09-11 PROCEDURE — 93306 TTE W/DOPPLER COMPLETE: CPT | Mod: 26

## 2019-09-11 RX ORDER — ASPIRIN/CALCIUM CARB/MAGNESIUM 324 MG
81 TABLET ORAL DAILY
Refills: 0 | Status: DISCONTINUED | OUTPATIENT
Start: 2019-09-10 | End: 2019-09-13

## 2019-09-11 RX ORDER — TAMSULOSIN HYDROCHLORIDE 0.4 MG/1
0.4 CAPSULE ORAL AT BEDTIME
Refills: 0 | Status: DISCONTINUED | OUTPATIENT
Start: 2019-09-10 | End: 2019-09-13

## 2019-09-11 RX ORDER — FINASTERIDE 5 MG/1
5 TABLET, FILM COATED ORAL DAILY
Refills: 0 | Status: DISCONTINUED | OUTPATIENT
Start: 2019-09-10 | End: 2019-09-13

## 2019-09-11 RX ORDER — INFLUENZA VIRUS VACCINE 15; 15; 15; 15 UG/.5ML; UG/.5ML; UG/.5ML; UG/.5ML
0.5 SUSPENSION INTRAMUSCULAR ONCE
Refills: 0 | Status: COMPLETED | OUTPATIENT
Start: 2019-09-11 | End: 2019-09-11

## 2019-09-11 RX ORDER — PANTOPRAZOLE SODIUM 20 MG/1
40 TABLET, DELAYED RELEASE ORAL
Refills: 0 | Status: DISCONTINUED | OUTPATIENT
Start: 2019-09-11 | End: 2019-09-13

## 2019-09-11 RX ORDER — ATORVASTATIN CALCIUM 80 MG/1
80 TABLET, FILM COATED ORAL AT BEDTIME
Refills: 0 | Status: DISCONTINUED | OUTPATIENT
Start: 2019-09-10 | End: 2019-09-13

## 2019-09-11 RX ADMIN — TAMSULOSIN HYDROCHLORIDE 0.4 MILLIGRAM(S): 0.4 CAPSULE ORAL at 22:34

## 2019-09-11 RX ADMIN — PANTOPRAZOLE SODIUM 40 MILLIGRAM(S): 20 TABLET, DELAYED RELEASE ORAL at 09:04

## 2019-09-11 RX ADMIN — FINASTERIDE 5 MILLIGRAM(S): 5 TABLET, FILM COATED ORAL at 11:43

## 2019-09-11 RX ADMIN — ATORVASTATIN CALCIUM 80 MILLIGRAM(S): 80 TABLET, FILM COATED ORAL at 22:34

## 2019-09-11 RX ADMIN — HEPARIN SODIUM 5000 UNIT(S): 5000 INJECTION INTRAVENOUS; SUBCUTANEOUS at 06:05

## 2019-09-11 RX ADMIN — HEPARIN SODIUM 5000 UNIT(S): 5000 INJECTION INTRAVENOUS; SUBCUTANEOUS at 14:30

## 2019-09-11 RX ADMIN — Medication 81 MILLIGRAM(S): at 11:43

## 2019-09-11 RX ADMIN — HEPARIN SODIUM 5000 UNIT(S): 5000 INJECTION INTRAVENOUS; SUBCUTANEOUS at 22:34

## 2019-09-11 NOTE — OCCUPATIONAL THERAPY INITIAL EVALUATION ADULT - PLANNED THERAPY INTERVENTIONS, OT EVAL
balance training/ADL retraining/fine motor coordination training/bed mobility training/transfer training/ROM/strengthening/neuromuscular re-education

## 2019-09-11 NOTE — H&P ADULT - NSHPLABSRESULTS_GEN_ALL_CORE
14.1   10.07 )-----------( 215      ( 10 Sep 2019 21:15 )             44.6   09-10    138  |  103  |  23  ----------------------------<  116<H>  4.1   |  24  |  0.93    Ca    9.5      10 Sep 2019 21:15    TPro  7.5  /  Alb  4.5  /  TBili  0.4  /  DBili  x   /  AST  16  /  ALT  19  /  AlkPhos  81  09-10    CTA Head and NECK: IMPRESSION:   CTA BRAIN: Patent intracranial circulation. No flow-limiting stenosis or   occlusion.   CTA NECK: Patent cervical vasculature. No flow limiting stenosis or   occlusion. Severe atherosclerotic disease involving the proximal right   vertebral artery near the basilar tip.     CT HEAD: IMPRESSION:   No CT evidence of an acute territorial infarct.  No acute intracranial bleeding, mass effect, or shift.   MRI can be performed for more sensitive evaluation if clinically   warranted.    Chest Xray: ******PRELIMINARY REPORT******        INTERPRETATION:  Clear lungs.

## 2019-09-11 NOTE — CONSULT NOTE ADULT - ATTENDING COMMENTS
Advanced care planning was discussed with patient and family.  Risks, benefits and alternatives of the cardiac treatments and medical therapy including procedures were discussed in detail and all questions were answered. Importance of compliance with medical therapy and lifestyle modification to improve cardiovascular health were addressed. Appropriate forms and patient educational materials were reviewed. 30 minutes face to face spent.
Patient seen and examined with resident. Agree with note and plan above.
69-year-old right-handed gentleman first evaluated at [facility] on 9/11/19 with gait difficulty and right hemiparesis. For several months prior to admission, he has had some gait disturbance, perhaps imbalance, for which he has been referred to a neurologist. On 9/10/19, he developed acute, much more severe gait difficulty and he had difficulty holding objects in his right hand. Medications at home included aspirin and simvastatin 40 mg daily. ROS otherwise negative. Exam. Alert and attentive; trace flattening of right nasolabial fold; no drift and power within normal limits throughout; gait not tested; remainder of neurologic exam was nonfocal. CT head (9/10/19) to my eye showed chronic lacunar infarcts: Right anterior limb of internal capsule, left thalamus, right corona radiata, and possibly a larger area of chronic ischemic change in the left centrum semiovale. CTA neck and head (9/10/19) to my eye showed severe stenosis of the intracranial right vertebral artery, and multifocal, intracranial atherosclerotic calcifications without significant stenosis. MRI brain (9/11/19) to my eye showed an acute small left MCA infarct in the pre-rolandic region. Impression. For a number of months, he has had mild difficulty with his balance. On 9/10/19, he developed acute, severe gait difficulty and probably at least mild right hemiparesis. His presentation was apparently due to an acute small left MCA cortical infarct. Mechanism is unknown and is consistent with embolic stroke of unknown source. He does have diffuse intracranial atherosclerosis but there is no significant stenosis other than the right vertebral artery which is an incidental finding. Suggest. TTE; implantable cardiac loop recorder; aspirin 81 mg daily-indefinitely; Plavix 300 mg loading dose, then 75 mg daily for 3 weeks; consider changing simvastatin 40 mg daily to atorvastatin 80 mg daily; PT/OT/PM&R.

## 2019-09-11 NOTE — H&P ADULT - NSICDXPASTMEDICALHX_GEN_ALL_CORE_FT
PAST MEDICAL HISTORY:  CAD (coronary artery disease)     Diabetes     Hypercholesterolemia     Hypertension     Neoplasm abdominal    Syncope and collapse 6/2015    TIA (transient ischemic attack) March 2015- was admitted at [facility]

## 2019-09-11 NOTE — CONSULT NOTE ADULT - SUBJECTIVE AND OBJECTIVE BOX
69y Male was admitted on 09-10    Patient is a 69y old  Male who presents with a chief complaint of Ataxia (11 Sep 2019 06:51)    HPI:  68 y/o Male with PMHx of HTN, HLD, DM,  ?arthritis, GI tumor s/p resection ,CAD s/p quad CABG, prior silent CVAs, TIA(2015)  presenting to [facility] ed on 9/10 with Ataxia and right hand weakness. Pt states that he noticed right second and third digit weakness x2 days PTA. On 9/10 4:30PM after finishing work, Pt was going down on stairs when he noticed that he was could not move his LLE and felt like he was dragging the left leg, which caused him to fall up to 2 stairs. No LOC or notable trauma endorsed.  NIHSS3; MRS 0. CT Head showing previous lacunar infarcts but negative for acute infarct or hemorrhage. CTA brain/ neck negative for stenosis but with severe atherosclerotic disease involving the proximal right vertebral artery near the basilar tip. Patient was not given TPA as out of window. MRI brain prelim report as per neuro with suspected acute small left MCA infarct.  Patient had dysphagia screen, currently on dysphagia 1 diet pureed/honey.  Pending TTE    TODAY'S SUBJECTIVE HX:    REVIEW OF SYSTEMS  Constitutional - No fever, No weight loss, No fatigue  HEENT - No eye pain, No visual disturbances, No difficulty hearing, No tinnitus, No vertigo, No neck pain  Respiratory - No cough, No wheezing, No shortness of breath  Cardiovascular - No chest pain, No palpitations  Gastrointestinal - No abdominal pain, No nausea, No vomiting, No diarrhea, No constipation  Genitourinary - No dysuria, No frequency, No hematuria, No incontinence  Neurological - No headaches, No memory loss, No loss of strength, No numbness, No tremors  Skin - No itching, No rashes, No lesions   Endocrine - No temperature intolerance  Musculoskeletal - No joint pain, No joint swelling, No muscle pain  Psychiatric - No depression, No anxiety    VITALS  T(C): 36.6 (09-11-19 @ 09:01), Max: 37 (09-10-19 @ 20:46)  HR: 79 (09-11-19 @ 09:01) (74 - 85)  BP: 160/98 (09-11-19 @ 09:01) (151/90 - 196/100)  RR: 17 (09-11-19 @ 09:01) (16 - 19)  SpO2: 100% (09-11-19 @ 09:01) (99% - 100%)  Wt(kg): --    PAST MEDICAL & SURGICAL HISTORY  Syncope and collapse  Neoplasm  Hypercholesterolemia  Vertigo  TIA (transient ischemic attack)  CAD (coronary artery disease)  Diabetes  Hypertension  Gastric mass  History of appendectomy  S/P angioplasty  S/P CABG x 3      SOCIAL HISTORY  Smoking - Denied  EtOH - Denied   Drugs - Denied    FUNCTIONAL HISTORY  Lives   Independent    CURRENT FUNCTIONAL STATUS 9/11  Bed mobility: supervision  Transfers: supervision  Gait: supervision w/o device 25 feet      FAMILY HISTORY   Family history of heart disease (Aunt)  Family history of heart failure (Sibling)      RECENT LABS/IMAGING  CBC Full  -  ( 11 Sep 2019 11:51 )  WBC Count : 6.49 K/uL  RBC Count : 5.64 M/uL  Hemoglobin : 14.2 g/dL  Hematocrit : 45.9 %  Platelet Count - Automated : 211 K/uL  Mean Cell Volume : 81.4 fL  Mean Cell Hemoglobin : 25.2 pg  Mean Cell Hemoglobin Concentration : 30.9 %  Auto Neutrophil # : 3.93 K/uL  Auto Lymphocyte # : 1.90 K/uL  Auto Monocyte # : 0.56 K/uL  Auto Eosinophil # : 0.06 K/uL  Auto Basophil # : 0.02 K/uL  Auto Neutrophil % : 60.6 %  Auto Lymphocyte % : 29.3 %  Auto Monocyte % : 8.6 %  Auto Eosinophil % : 0.9 %  Auto Basophil % : 0.3 %    09-11    142  |  101  |  15  ----------------------------<  123<H>  4.1   |  27  |  0.77    Ca    9.3      11 Sep 2019 11:51  Phos  3.7     09-11  Mg     2.0     09-11    TPro  7.5  /  Alb  4.5  /  TBili  0.4  /  DBili  x   /  AST  16  /  ALT  19  /  AlkPhos  81  09-10        ALLERGIES  No Known Allergies      MEDICATIONS   aspirin enteric coated 81 milliGRAM(s) Oral daily  atorvastatin 80 milliGRAM(s) Oral at bedtime  dextrose 40% Gel 15 Gram(s) Oral once PRN  dextrose 5%. 1000 milliLiter(s) IV Continuous <Continuous>  dextrose 50% Injectable 12.5 Gram(s) IV Push once  dextrose 50% Injectable 25 Gram(s) IV Push once  dextrose 50% Injectable 25 Gram(s) IV Push once  finasteride 5 milliGRAM(s) Oral daily  glucagon  Injectable 1 milliGRAM(s) IntraMuscular once PRN  heparin  Injectable 5000 Unit(s) SubCutaneous three times a day  influenza   Vaccine 0.5 milliLiter(s) IntraMuscular once  insulin lispro (HumaLOG) corrective regimen sliding scale   SubCutaneous three times a day before meals  insulin lispro (HumaLOG) corrective regimen sliding scale   SubCutaneous at bedtime  pantoprazole    Tablet 40 milliGRAM(s) Oral before breakfast  tamsulosin 0.4 milliGRAM(s) Oral at bedtime 69y Male was admitted on 09-10    Patient is a 69y old  Male who presents with a chief complaint of Ataxia (11 Sep 2019 06:51)    HPI:  68 y/o Male with PMHx of HTN, HLD, DM,  ?arthritis, GI tumor s/p resection ,CAD s/p quad CABG, prior silent CVAs, TIA(2015)  presenting to [facility] ed on 9/10 with Ataxia and right hand weakness. Pt states that he noticed right second and third digit weakness x2 days PTA. Patient states that he has had chronic left knee pain which he states mild affects his ambulation but on 9/10 he noticed that he was could not move his RLE and felt like he was dragging it. No LOC or notable trauma endorsed.  NIHSS3; MRS 0. CT Head showing previous lacunar infarcts but negative for acute infarct or hemorrhage. CTA brain/ neck negative for stenosis but with severe atherosclerotic disease involving the proximal right vertebral artery near the basilar tip. Patient was not given TPA as out of window. MRI brain showing Acute infarct involving the left frontal white matter with Chronic basal ganglia, left thalamic and right pontine lacunae infarcts.   Patient had dysphagia screen, currently on dysphagia 1 diet pureed/honey. Pending TTE    TODAY'S SUBJECTIVE HX:  Patient state that his weakness and ambulation has improved. Denies further numbness in the UE. Endorses some left knee pain with ambulation/stairs and that he was tole before he has arthritis there, but has not had imaging or prior intervention for this.    REVIEW OF SYSTEMS  Constitutional -  No fatigue  HEENT - No visual disturbances,   Respiratory - No shortness of breath  Cardiovascular - No chest pain  Gastrointestinal - No abdominal pain  Neurological -  loss of strength and numbness improved  Musculoskeletal - left knee pain as above      VITALS  T(C): 36.6 (09-11-19 @ 09:01), Max: 37 (09-10-19 @ 20:46)  HR: 79 (09-11-19 @ 09:01) (74 - 85)  BP: 160/98 (09-11-19 @ 09:01) (151/90 - 196/100)  RR: 17 (09-11-19 @ 09:01) (16 - 19)  SpO2: 100% (09-11-19 @ 09:01) (99% - 100%)  Wt(kg): --    PAST MEDICAL & SURGICAL HISTORY  Syncope and collapse  Neoplasm  Hypercholesterolemia  Vertigo  TIA (transient ischemic attack)  CAD (coronary artery disease)  Diabetes  Hypertension  Gastric mass  History of appendectomy  S/P angioplasty  S/P CABG x 3      SOCIAL HISTORY  Smoking - Denied  EtOH - Denied   Drugs - Denied    FUNCTIONAL HISTORY  Lives with wife and children in 2 story private home with 0 inderjit. Bedroom is upstairs. PTA, patient independent with ADLs/ambulaiton. Worked in [employer].    CURRENT FUNCTIONAL STATUS 9/11  Bed mobility: supervision  Transfers: supervision  Gait: supervision w/o device 25 feet      FAMILY HISTORY   Family history of heart disease (Aunt)  Family history of heart failure (Sibling)      RECENT LABS/IMAGING  CBC Full  -  ( 11 Sep 2019 11:51 )  WBC Count : 6.49 K/uL  RBC Count : 5.64 M/uL  Hemoglobin : 14.2 g/dL  Hematocrit : 45.9 %  Platelet Count - Automated : 211 K/uL  Mean Cell Volume : 81.4 fL  Mean Cell Hemoglobin : 25.2 pg  Mean Cell Hemoglobin Concentration : 30.9 %  Auto Neutrophil # : 3.93 K/uL  Auto Lymphocyte # : 1.90 K/uL  Auto Monocyte # : 0.56 K/uL  Auto Eosinophil # : 0.06 K/uL  Auto Basophil # : 0.02 K/uL  Auto Neutrophil % : 60.6 %  Auto Lymphocyte % : 29.3 %  Auto Monocyte % : 8.6 %  Auto Eosinophil % : 0.9 %  Auto Basophil % : 0.3 %    09-11    142  |  101  |  15  ----------------------------<  123<H>  4.1   |  27  |  0.77    Ca    9.3      11 Sep 2019 11:51  Phos  3.7     09-11  Mg     2.0     09-11    TPro  7.5  /  Alb  4.5  /  TBili  0.4  /  DBili  x   /  AST  16  /  ALT  19  /  AlkPhos  81  09-10        ALLERGIES  No Known Allergies      MEDICATIONS   aspirin enteric coated 81 milliGRAM(s) Oral daily  atorvastatin 80 milliGRAM(s) Oral at bedtime  dextrose 40% Gel 15 Gram(s) Oral once PRN  dextrose 5%. 1000 milliLiter(s) IV Continuous <Continuous>  dextrose 50% Injectable 12.5 Gram(s) IV Push once  dextrose 50% Injectable 25 Gram(s) IV Push once  dextrose 50% Injectable 25 Gram(s) IV Push once  finasteride 5 milliGRAM(s) Oral daily  glucagon  Injectable 1 milliGRAM(s) IntraMuscular once PRN  heparin  Injectable 5000 Unit(s) SubCutaneous three times a day  influenza   Vaccine 0.5 milliLiter(s) IntraMuscular once  insulin lispro (HumaLOG) corrective regimen sliding scale   SubCutaneous three times a day before meals  insulin lispro (HumaLOG) corrective regimen sliding scale   SubCutaneous at bedtime  pantoprazole    Tablet 40 milliGRAM(s) Oral before breakfast  tamsulosin 0.4 milliGRAM(s) Oral at bedtime    ----------------------------------------------------------------------------------------  PHYSICAL EXAM  Constitutional - NAD, Comfortable  HEENT - NCAT, EOMI  Neck - Supple, No limited ROM  Chest - Breathing comfortably, No wheezing  Extremities - No C/C/E, mild crepitus in left knee. medial jt line tenderness  Neurologic Exam -                    Cognitive - Awake, Alert, AAO to self, place, date, year, situation     Communication - Fluent, No dysarthria     Cranial Nerves - CN 2-12 intact     Motor - No focal deficits                    LEFT    UE - ShAB 5/5, EF 5/5, EE 5/5, WE 5/5,  5/5                    RIGHT UE - ShAB 5/5, EF 5/5, EE 5/5, WE 5/5,  5/5                    LEFT    LE - HF 5/5, KE 5/5, DF 5/5, PF 5/5                    RIGHT LE - HF 5/5, KE 5/5, DF 5/5, PF 5/5        Sensory - Intact to LT/temperature      Reflexes - b/l Knee +3/4, Achilles 2/4 No primitive reflexive       Balance - WNL Static  Psychiatric - Mood stable, Affect WNL  Function: Bed mobility and transfers independent Ambulates at supervision level    ------------------------------------------------------------------------------------------------  ASSESSMENT/PLAN  69yMale with functional deficits after left MCA CVA  CVA - risk factor reduction: ASA, statin,  pending TTE,  Nutrition - currently on Dysphagia 1, continue s/s treatment and advance as tolerated    Knee pain - consider xray knee either inpatient or as outpatient to evaluate for left knee OA  DVT PPX - SCDs, heparin  Rehab -   While inpatient, continue PT/OT/SLP to prevent from deconditioning 2/2 to immobility and improve overall patient gait. Upon discharge, Expect patient to achieve functional goals for DC HOME with OUTPATIENT 69y Male was admitted on 09-10    Patient is a 69y old  Male who presents with a chief complaint of Ataxia (11 Sep 2019 06:51)    HPI:  70 y/o Male with PMHx of HTN, HLD, DM,  ?arthritis, GI tumor s/p resection ,CAD s/p quad CABG, prior silent CVAs, TIA(2015)  presenting to [facility] ed on 9/10 with Ataxia and right hand weakness. Pt states that he noticed right second and third digit weakness x2 days PTA. Patient states that he has had chronic left knee pain which he states mild affects his ambulation but on 9/10 he noticed that he was could not move his RLE and felt like he was dragging it. No LOC or notable trauma endorsed.  NIHSS3; MRS 0. CT Head showing previous lacunar infarcts but negative for acute infarct or hemorrhage. CTA brain/ neck negative for stenosis but with severe atherosclerotic disease involving the proximal right vertebral artery near the basilar tip. Patient was not given TPA as out of window. MRI brain showing Acute infarct involving the left frontal white matter with Chronic basal ganglia, left thalamic and right pontine lacunae infarcts.   Patient had dysphagia screen, currently on dysphagia 1 diet pureed/honey. Pending TTE    TODAY'S SUBJECTIVE HX:  Patient state that his weakness and ambulation has improved. Denies further numbness in the UE. Endorses some left knee pain with ambulation/stairs and that he was told he has arthritis in the past,  but has not had imaging or prior intervention for this.    REVIEW OF SYSTEMS  Constitutional -  No fatigue  HEENT - No new visual disturbances,  + cataracts  Respiratory - No shortness of breath  Cardiovascular - No chest pain  Gastrointestinal - No abdominal pain  Neurological -  loss of strength and numbness improved  Musculoskeletal - left knee pain as above      VITALS  T(C): 36.6 (09-11-19 @ 09:01), Max: 37 (09-10-19 @ 20:46)  HR: 79 (09-11-19 @ 09:01) (74 - 85)  BP: 160/98 (09-11-19 @ 09:01) (151/90 - 196/100)  RR: 17 (09-11-19 @ 09:01) (16 - 19)  SpO2: 100% (09-11-19 @ 09:01) (99% - 100%)  Wt(kg): --    PAST MEDICAL & SURGICAL HISTORY  Syncope and collapse  Neoplasm  Hypercholesterolemia  Vertigo  TIA (transient ischemic attack)  CAD (coronary artery disease)  Diabetes  Hypertension  Gastric mass  History of appendectomy  S/P angioplasty  S/P CABG x 3      SOCIAL HISTORY  Smoking - Denied  EtOH - Denied   Drugs - Denied    FUNCTIONAL HISTORY  Lives with wife and children in 2 story private home with 0 inderjit. Bedroom is upstairs. PTA, patient independent with ADLs/ambulaiton. Worked in [employer].    CURRENT FUNCTIONAL STATUS 9/11  Bed mobility: supervision  Transfers: supervision  Gait: supervision w/o device 25 feet      FAMILY HISTORY   Family history of heart disease (Aunt)  Family history of heart failure (Sibling)      RECENT LABS/IMAGING  CBC Full  -  ( 11 Sep 2019 11:51 )  WBC Count : 6.49 K/uL  RBC Count : 5.64 M/uL  Hemoglobin : 14.2 g/dL  Hematocrit : 45.9 %  Platelet Count - Automated : 211 K/uL  Mean Cell Volume : 81.4 fL  Mean Cell Hemoglobin : 25.2 pg  Mean Cell Hemoglobin Concentration : 30.9 %  Auto Neutrophil # : 3.93 K/uL  Auto Lymphocyte # : 1.90 K/uL  Auto Monocyte # : 0.56 K/uL  Auto Eosinophil # : 0.06 K/uL  Auto Basophil # : 0.02 K/uL  Auto Neutrophil % : 60.6 %  Auto Lymphocyte % : 29.3 %  Auto Monocyte % : 8.6 %  Auto Eosinophil % : 0.9 %  Auto Basophil % : 0.3 %    09-11    142  |  101  |  15  ----------------------------<  123<H>  4.1   |  27  |  0.77    Ca    9.3      11 Sep 2019 11:51  Phos  3.7     09-11  Mg     2.0     09-11    TPro  7.5  /  Alb  4.5  /  TBili  0.4  /  DBili  x   /  AST  16  /  ALT  19  /  AlkPhos  81  09-10        ALLERGIES  No Known Allergies      MEDICATIONS   aspirin enteric coated 81 milliGRAM(s) Oral daily  atorvastatin 80 milliGRAM(s) Oral at bedtime  dextrose 40% Gel 15 Gram(s) Oral once PRN  dextrose 5%. 1000 milliLiter(s) IV Continuous <Continuous>  dextrose 50% Injectable 12.5 Gram(s) IV Push once  dextrose 50% Injectable 25 Gram(s) IV Push once  dextrose 50% Injectable 25 Gram(s) IV Push once  finasteride 5 milliGRAM(s) Oral daily  glucagon  Injectable 1 milliGRAM(s) IntraMuscular once PRN  heparin  Injectable 5000 Unit(s) SubCutaneous three times a day  influenza   Vaccine 0.5 milliLiter(s) IntraMuscular once  insulin lispro (HumaLOG) corrective regimen sliding scale   SubCutaneous three times a day before meals  insulin lispro (HumaLOG) corrective regimen sliding scale   SubCutaneous at bedtime  pantoprazole    Tablet 40 milliGRAM(s) Oral before breakfast  tamsulosin 0.4 milliGRAM(s) Oral at bedtime    ----------------------------------------------------------------------------------------  PHYSICAL EXAM  Constitutional - NAD, Comfortable  HEENT - NCAT, EOMI  Neck - Supple, No limited ROM  Chest - Breathing comfortably, No wheezing  Extremities - No C/C/E, mild crepitus in left knee. medial jt line tenderness  Neurologic Exam -                    Cognitive - Awake, Alert, AAO to self, place, date, year, situation     Communication - Fluent, No dysarthria     Cranial Nerves - CN 2-12 intact     Motor - No focal deficits                    LEFT    UE - ShAB 5/5, EF 5/5, EE 5/5, WE 5/5,  5/5                    RIGHT UE - ShAB 5/5, EF 5/5, EE 5/5, WE 5/5,  5/5                    LEFT    LE - HF 5/5, KE 5/5, DF 5/5, PF 5/5                    RIGHT LE - HF 5/5, KE 5/5, DF 5/5, PF 5/5        Sensory - Intact to LT/temperature      Reflexes - b/l Knee +3/4, Achilles 2/4 No primitive reflexive       Balance - WNL Static  Psychiatric - Mood stable, Affect WNL  Function: Bed mobility and transfers independent Ambulates at supervision level    ------------------------------------------------------------------------------------------------  ASSESSMENT/PLAN  69yMale with functional deficits after left MCA CVA  CVA - risk factor reduction: ASA, statin,  pending TTE, recs per neuro  recommend swallow evaluation due to recent CVA, however patient denies difficulty with speech or swallow currently - currently on Dysphagia 1  Knee pain - consider xray knee either inpatient or as outpatient to evaluate for left knee OA  DVT PPX - SCDs, heparin  Rehab -   While inpatient, continue PT/OT to prevent from deconditioning 2/2 to immobility and improve overall patient gait.  patient ambulates quickly, counseled to slow down and take his time while walking.  Recommend discharge home when medically cleared.  If patient's balance impaired or knee pain continues, can follow up with outpatient PM&R or be referred for outpatient PT.

## 2019-09-11 NOTE — OCCUPATIONAL THERAPY INITIAL EVALUATION ADULT - MD ORDER
Occupational Therapy (OT) to evaluate and treat. Occupational Therapy (OT) to evaluate and treat. Out of bed with assistance. Ambulate with assistance. Per SAMANTHA Soriano, pt is okay to participate in OT evaluation and perform activity as tolerated.

## 2019-09-11 NOTE — OCCUPATIONAL THERAPY INITIAL EVALUATION ADULT - ANTICIPATED DISCHARGE DISPOSITION, OT EVAL
Anticipate Home with Outpatient OT services to address decreased strength/coordination for Right UE and return to baseline. Pt reports his wife and daughter are able to assist him with ADL's as needed.

## 2019-09-11 NOTE — H&P ADULT - ASSESSMENT
70 y/o Male with PMHx of HTN, HLD, DM,  ?arthritis, GI tumor s/p resection ,CAD s/p quad CABG, prior silent CVAs, TIA(2015)  presents with Ataxia and right hand weakness. pt is being admitted for CVA work up

## 2019-09-11 NOTE — CONSULT NOTE ADULT - SUBJECTIVE AND OBJECTIVE BOX
CHIEF COMPLAINT:Patient is a 69y old  Male who presents with a chief complaint of Ataxia (11 Sep 2019 00:04)      HISTORY OF PRESENT ILLNESS:  This is a pleasant 70 y/o Male known to me from office  with PMHx of HTN, HLD, DM,  ?arthritis, GI tumor s/p resection ,CAD s/p quad CABG, prior silent CVAs, TIA(2015)  presents with Ataxia and right hand weakness. He states his walking has gotten worse and tends to move to his left side when he walks  He denies any chest pain, sob, palpitation, dizziness or syncope.       PAST MEDICAL & SURGICAL HISTORY:  Syncope and collapse: 6/2015  Neoplasm: abdominal  Hypercholesterolemia  Vertigo  TIA (transient ischemic attack): March 2015- was admitted at [facility] (coronary artery disease)  Diabetes  Hypertension  Gastric mass: resection 2 weeks ago  History of appendectomy: 1969  S/P angioplasty: 2008 (prior to CABG x3)  S/P CABG x 3: 2008          MEDICATIONS:  aspirin enteric coated 81 milliGRAM(s) Oral daily  heparin  Injectable 5000 Unit(s) SubCutaneous three times a day  tamsulosin 0.4 milliGRAM(s) Oral at bedtime          pantoprazole    Tablet 40 milliGRAM(s) Oral before breakfast    atorvastatin 80 milliGRAM(s) Oral at bedtime  dextrose 40% Gel 15 Gram(s) Oral once PRN  dextrose 50% Injectable 12.5 Gram(s) IV Push once  dextrose 50% Injectable 25 Gram(s) IV Push once  dextrose 50% Injectable 25 Gram(s) IV Push once  finasteride 5 milliGRAM(s) Oral daily  glucagon  Injectable 1 milliGRAM(s) IntraMuscular once PRN  insulin lispro (HumaLOG) corrective regimen sliding scale   SubCutaneous three times a day before meals  insulin lispro (HumaLOG) corrective regimen sliding scale   SubCutaneous at bedtime    dextrose 5%. 1000 milliLiter(s) IV Continuous <Continuous>  influenza   Vaccine 0.5 milliLiter(s) IntraMuscular once      FAMILY HISTORY:  Family history of heart disease (Aunt)  Family history of heart failure (Sibling)      Non-contributory    SOCIAL HISTORY:    No tobacco, drugs or etoh    Allergies    No Known Allergies    Intolerances    	    REVIEW OF SYSTEMS:  as above  The rest of the 14 points ROS reviewed and except above they are unremarkable.        PHYSICAL EXAM:  T(C): 36.8 (09-11-19 @ 04:16), Max: 37 (09-10-19 @ 20:46)  HR: 77 (09-11-19 @ 04:16) (77 - 85)  BP: 165/91 (09-11-19 @ 04:16) (152/88 - 196/100)  RR: 18 (09-11-19 @ 04:16) (16 - 19)  SpO2: 100% (09-11-19 @ 04:16) (99% - 100%)  Wt(kg): --  I&O's Summary      JVP: Normal  Neck: supple  Lung: clear   CV: S1 S2 , Murmur:  Abd: soft  Ext: No edema  neuro: Awake / alert  Psych: flat affect  Skin: normal    LABS/DATA:    TELEMETRY: 	    ECG:  	   	  CARDIAC MARKERS:      CKMB: 4.32 ng/mL (09-10 @ 21:15)          < from: CT Angio Neck w/ IV Cont (09.10.19 @ 21:36) >    IMPRESSION:   CTA BRAIN: Patent intracranial circulation. No flow-limiting stenosis or   occlusion.   CTA NECK: Patent cervical vasculature. No flow limiting stenosis or   occlusion. Severe atherosclerotic disease involving the proximal right   vertebral artery near the basilar tip.       < end of copied text >                          14.1   10.07 )-----------( 215      ( 10 Sep 2019 21:15 )             44.6     09-10    138  |  103  |  23  ----------------------------<  116<H>  4.1   |  24  |  0.93    Ca    9.5      10 Sep 2019 21:15    TPro  7.5  /  Alb  4.5  /  TBili  0.4  /  DBili  x   /  AST  16  /  ALT  19  /  AlkPhos  81  09-10    proBNP:   Lipid Profile:   HgA1c: Hemoglobin A1C, Whole Blood: 6.4 % (09-11 @ 06:05)    TSH:

## 2019-09-11 NOTE — H&P ADULT - NSHPPHYSICALEXAM_GEN_ALL_CORE
T(C): 36.8 (09-11-19 @ 04:16), Max: 37 (09-10-19 @ 20:46)  HR: 77 (09-11-19 @ 04:16) (77 - 85)  BP: 165/91 (09-11-19 @ 04:16) (152/88 - 196/100)  RR: 18 (09-11-19 @ 04:16) (16 - 19)  SpO2: 100% (09-11-19 @ 04:16) (99% - 100%)

## 2019-09-11 NOTE — H&P ADULT - NSICDXPASTSURGICALHX_GEN_ALL_CORE_FT
PAST SURGICAL HISTORY:  Gastric mass resection 2 weeks ago    History of appendectomy 1969    S/P angioplasty 2008 (prior to CABG x3)    S/P CABG x 3 2008

## 2019-09-11 NOTE — OCCUPATIONAL THERAPY INITIAL EVALUATION ADULT - LIVES WITH, PROFILE
Pt. reports he lives with his wife and daughter in a house with no steps to enter. Once inside, pt. reports he has a full flight of steps to negotiate to reach 2nd floor where main bedroom and bathroom are located. Per pt., he has a shower stall in his bathroom.

## 2019-09-11 NOTE — H&P ADULT - NEGATIVE CARDIOVASCULAR SYMPTOMS
no orthopnea/no palpitations/no peripheral edema/no claudication/no dyspnea on exertion/no chest pain

## 2019-09-11 NOTE — H&P ADULT - PROBLEM SELECTOR PLAN 4
- Hold Home BP meds 2/2 to permissive htn x24 hrs  - BP meds could be resumed after 24hrs- 48hrs  - monitor BP   - DASH/TLC diet

## 2019-09-11 NOTE — H&P ADULT - PROBLEM SELECTOR PLAN 1
- AM labs  - Neuro c/s appreciated   - CTH - Negative  - MRI H  - TTE w/ bubble study   - Neuro checks q4hrs   - permissive HTN x24 hrs  - ASA 81 and Statin  - dysphagia screen passed , placed on type 1 dysphagia  diet  - aspiration precautions ,fall precautions   - S&S eval  - PT/OT eval - AM labs  - Neuro c/s appreciated   - On CTA Neck: Severe atherosclerotic disease involving the proximal right   vertebral artery near the basilar tip.   - CTH - Negative  - MRI H  - TTE w/ bubble study   - Neuro checks q4hrs   - permissive HTN x24 hrs  - ASA 81 and Statin  - dysphagia screen passed , placed on type 1 dysphagia  diet  - aspiration precautions ,fall precautions   - S&S eval  - PT/OT eval

## 2019-09-11 NOTE — OCCUPATIONAL THERAPY INITIAL EVALUATION ADULT - PERTINENT HX OF CURRENT PROBLEM, REHAB EVAL
Pt is a 69 year old male with PMHx of HTN, HLD, DM, arthritis, GI tumor s/p resection, CAD s/p quad CABG, prior silent CVAs, & TIA (2015), who presented to [facility] on 9/10/19 with ataxia, right hand weakness, and left LE weakness. CT Brain Scan on 9/10/19 displayed no CT evidence of an acute territorial infarct. No acute intracranial bleeding, mass effect, or shift.

## 2019-09-11 NOTE — CONSULT NOTE ADULT - ASSESSMENT
Ataxia  CVA  likely due to vertebral artery disease   fu with MRI and neurology eval   agree with echo     CAD   cont asa, statin     DM  Monitor finger stick. Insulin coverage. Diabetic education and Diabetic diet. Consider nutrition consultation.    HTN  permissive HTN as per neurology

## 2019-09-11 NOTE — PROGRESS NOTE ADULT - SUBJECTIVE AND OBJECTIVE BOX
Patient is a 69y old  Male who presents with a chief complaint of Ataxia (11 Sep 2019 13:51)      SUBJECTIVE / OVERNIGHT EVENTS:  Patient has no new complaints. Denies cp, SOB, abdominal pain, N/V/D     MEDICATIONS  (STANDING):  aspirin enteric coated 81 milliGRAM(s) Oral daily  atorvastatin 80 milliGRAM(s) Oral at bedtime  dextrose 5%. 1000 milliLiter(s) (50 mL/Hr) IV Continuous <Continuous>  dextrose 50% Injectable 12.5 Gram(s) IV Push once  dextrose 50% Injectable 25 Gram(s) IV Push once  dextrose 50% Injectable 25 Gram(s) IV Push once  finasteride 5 milliGRAM(s) Oral daily  heparin  Injectable 5000 Unit(s) SubCutaneous three times a day  influenza   Vaccine 0.5 milliLiter(s) IntraMuscular once  insulin lispro (HumaLOG) corrective regimen sliding scale   SubCutaneous three times a day before meals  insulin lispro (HumaLOG) corrective regimen sliding scale   SubCutaneous at bedtime  pantoprazole    Tablet 40 milliGRAM(s) Oral before breakfast  tamsulosin 0.4 milliGRAM(s) Oral at bedtime    MEDICATIONS  (PRN):  dextrose 40% Gel 15 Gram(s) Oral once PRN Blood Glucose LESS THAN 70 milliGRAM(s)/deciliter  glucagon  Injectable 1 milliGRAM(s) IntraMuscular once PRN Glucose LESS THAN 70 milligrams/deciliter      Vital Signs Last 24 Hrs  T(C): 36.6 (11 Sep 2019 09:01), Max: 37 (10 Sep 2019 20:46)  T(F): 97.8 (11 Sep 2019 09:01), Max: 98.6 (10 Sep 2019 20:46)  HR: 79 (11 Sep 2019 09:01) (74 - 85)  BP: 160/98 (11 Sep 2019 09:01) (151/90 - 196/100)  BP(mean): --  RR: 17 (11 Sep 2019 09:01) (16 - 19)  SpO2: 100% (11 Sep 2019 09:01) (99% - 100%)  CAPILLARY BLOOD GLUCOSE  109 (10 Sep 2019 21:18)      POCT Blood Glucose.: 103 mg/dL (11 Sep 2019 13:02)  POCT Blood Glucose.: 92 mg/dL (11 Sep 2019 09:17)  POCT Blood Glucose.: 108 mg/dL (10 Sep 2019 21:53)  POCT Blood Glucose.: 109 mg/dL (10 Sep 2019 20:57)    I&O's Summary      PHYSICAL EXAM:  GENERAL: NAD, well-developed  HEAD:  Atraumatic, Normocephalic  EYES: EOMI, PERRLA, conjunctiva and sclera clear  NECK: Supple, No JVD  CHEST/LUNG: Clear to auscultation bilaterally; No wheeze  HEART: Regular rate and rhythm; No murmurs, rubs, or gallops  ABDOMEN: Soft, Nontender, Nondistended; Bowel sounds present  EXTREMITIES:  2+ Peripheral Pulses, No clubbing, cyanosis, or edema  PSYCH: AAOx3  NEUROLOGY: ataxia 3/5 right hand weakness  SKIN: No rashes or lesions    LABS:                        14.2   6.49  )-----------( 211      ( 11 Sep 2019 11:51 )             45.9     09-11    142  |  101  |  15  ----------------------------<  123<H>  4.1   |  27  |  0.77    Ca    9.3      11 Sep 2019 11:51  Phos  3.7     09-11  Mg     2.0     09-11    TPro  7.5  /  Alb  4.5  /  TBili  0.4  /  DBili  x   /  AST  16  /  ALT  19  /  AlkPhos  81  09-10    PT/INR - ( 10 Sep 2019 21:15 )   PT: 11.7 SEC;   INR: 1.05          PTT - ( 10 Sep 2019 21:15 )  PTT:31.2 SEC  CARDIAC MARKERS ( 11 Sep 2019 11:51 )  x     / x     / 610 u/L / 3.44 ng/mL / x      CARDIAC MARKERS ( 10 Sep 2019 21:15 )  x     / x     / 256 u/L / 4.32 ng/mL / x              RADIOLOGY & ADDITIONAL TESTS:    Imaging Personally Reviewed: < from: MR Head No Cont (09.11.19 @ 11:53) >  IMPRESSION:    Acute infarct involving the left frontal white matter.    Chronic basal ganglia, left thalamic and right pontine lacunae.    Moderate to marked microvascular-type changes.    Vertebrobasilar dolichoectasia.    < end of copied text >      Consultant(s) Notes Reviewed:      Care Discussed with Consultants/Other Providers: Patient is a 69y old  Male who presents with a chief complaint of Ataxia (11 Sep 2019 13:51)      SUBJECTIVE / OVERNIGHT EVENTS:  Patient has no new complaints. Denies cp, SOB, abdominal pain, N/V/D     MEDICATIONS  (STANDING):  aspirin enteric coated 81 milliGRAM(s) Oral daily  atorvastatin 80 milliGRAM(s) Oral at bedtime  dextrose 5%. 1000 milliLiter(s) (50 mL/Hr) IV Continuous <Continuous>  dextrose 50% Injectable 12.5 Gram(s) IV Push once  dextrose 50% Injectable 25 Gram(s) IV Push once  dextrose 50% Injectable 25 Gram(s) IV Push once  finasteride 5 milliGRAM(s) Oral daily  heparin  Injectable 5000 Unit(s) SubCutaneous three times a day  influenza   Vaccine 0.5 milliLiter(s) IntraMuscular once  insulin lispro (HumaLOG) corrective regimen sliding scale   SubCutaneous three times a day before meals  insulin lispro (HumaLOG) corrective regimen sliding scale   SubCutaneous at bedtime  pantoprazole    Tablet 40 milliGRAM(s) Oral before breakfast  tamsulosin 0.4 milliGRAM(s) Oral at bedtime    MEDICATIONS  (PRN):  dextrose 40% Gel 15 Gram(s) Oral once PRN Blood Glucose LESS THAN 70 milliGRAM(s)/deciliter  glucagon  Injectable 1 milliGRAM(s) IntraMuscular once PRN Glucose LESS THAN 70 milligrams/deciliter      Vital Signs Last 24 Hrs  T(C): 36.6 (11 Sep 2019 09:01), Max: 37 (10 Sep 2019 20:46)  T(F): 97.8 (11 Sep 2019 09:01), Max: 98.6 (10 Sep 2019 20:46)  HR: 79 (11 Sep 2019 09:01) (74 - 85)  BP: 160/98 (11 Sep 2019 09:01) (151/90 - 196/100)  BP(mean): --  RR: 17 (11 Sep 2019 09:01) (16 - 19)  SpO2: 100% (11 Sep 2019 09:01) (99% - 100%)  CAPILLARY BLOOD GLUCOSE  109 (10 Sep 2019 21:18)      POCT Blood Glucose.: 103 mg/dL (11 Sep 2019 13:02)  POCT Blood Glucose.: 92 mg/dL (11 Sep 2019 09:17)  POCT Blood Glucose.: 108 mg/dL (10 Sep 2019 21:53)  POCT Blood Glucose.: 109 mg/dL (10 Sep 2019 20:57)    I&O's Summary      PHYSICAL EXAM:  GENERAL: NAD, well-developed  HEAD:  Atraumatic, Normocephalic  EYES: EOMI, PERRLA, conjunctiva and sclera clear  NECK: Supple, No JVD  CHEST/LUNG: Clear to auscultation bilaterally; No wheeze  HEART: Regular rate and rhythm; No murmurs, rubs, or gallops  ABDOMEN: Soft, Nontender, Nondistended; Bowel sounds present  EXTREMITIES:  2+ Peripheral Pulses, No clubbing, cyanosis, or edema  PSYCH: AAOx3  NEUROLOGY: ataxia 4/5 right hand weakness  SKIN: No rashes or lesions    LABS:                        14.2   6.49  )-----------( 211      ( 11 Sep 2019 11:51 )             45.9     09-11    142  |  101  |  15  ----------------------------<  123<H>  4.1   |  27  |  0.77    Ca    9.3      11 Sep 2019 11:51  Phos  3.7     09-11  Mg     2.0     09-11    TPro  7.5  /  Alb  4.5  /  TBili  0.4  /  DBili  x   /  AST  16  /  ALT  19  /  AlkPhos  81  09-10    PT/INR - ( 10 Sep 2019 21:15 )   PT: 11.7 SEC;   INR: 1.05          PTT - ( 10 Sep 2019 21:15 )  PTT:31.2 SEC  CARDIAC MARKERS ( 11 Sep 2019 11:51 )  x     / x     / 610 u/L / 3.44 ng/mL / x      CARDIAC MARKERS ( 10 Sep 2019 21:15 )  x     / x     / 256 u/L / 4.32 ng/mL / x              RADIOLOGY & ADDITIONAL TESTS:    Imaging Personally Reviewed: < from: MR Head No Cont (09.11.19 @ 11:53) >  IMPRESSION:    Acute infarct involving the left frontal white matter.    Chronic basal ganglia, left thalamic and right pontine lacunae.    Moderate to marked microvascular-type changes.    Vertebrobasilar dolichoectasia.    < end of copied text >      Consultant(s) Notes Reviewed:      Care Discussed with Consultants/Other Providers:

## 2019-09-11 NOTE — H&P ADULT - RS GEN PE MLT RESP DETAILS PC
no intercostal retractions/breath sounds equal/no wheezes/airway patent/no subcutaneous emphysema/no rales/normal/no rhonchi

## 2019-09-11 NOTE — H&P ADULT - HISTORY OF PRESENT ILLNESS
70 y/o Male with PMHx of HTN, HLD, DM,  ?arthritis, GI tumor s/p resection ,CAD s/p quad CABG, prior silent CVAs, TIA(2015)  presents with Ataxia and right hand weakness. pt states that he noticed right second and third digit weakness x2 days which he assumed would resolve on its own. 9/10 4:30PM after finishing work, Pt was going down on stairs when he noticed that he was could not move his LLE. pt stated that he felt like he was dragging the left leg, which caused him to fall maximum 1-2 stairs.No LOC and No obvious trauma noted. pt noticed that he was tilting towards left sided. pt denies chest pain, SOB, CAMACHO, PND, orthopnea, palpitations, diaphoresis, lightheadedness, dizziness, syncope, increased lowr extremity edema, fever chills, malaise, myalgias, anorexia, weight changes ( loss or gain), night sweats, generalized fatigue abdominal pain, N/V/C/D, melena, urinary symptoms, cough, and wheezing. 68 y/o Male with PMHx of HTN, HLD, DM,  ?arthritis, GI tumor s/p resection ,CAD s/p quad CABG, prior silent CVAs, TIA(2015)  presents with Ataxia and right hand weakness. pt states that he noticed right second and third digit weakness x2 days which he assumed would resolve on its own. 9/10 4:30PM after finishing work, Pt was going down on stairs when he noticed that he was could not move his LLE. pt stated that he felt like he was dragging the left leg, which caused him to fall maximum 1-2 stairs. No LOC and No obvious trauma noted. pt noticed that he was tilting towards left sided. pt denies chest pain, SOB, CAMACHO, PND, orthopnea, palpitations, diaphoresis, lightheadedness, dizziness, syncope, increased lowr extremity edema, fever chills, malaise, myalgias, anorexia, weight changes ( loss or gain), night sweats, generalized fatigue abdominal pain, N/V/C/D, melena, urinary symptoms, cough, and wheezing.

## 2019-09-11 NOTE — H&P ADULT - NEUROLOGICAL DETAILS
alert and oriented x 3/sensation intact/no spontaneous movement/cranial nerves intact/normal strength/responds to pain

## 2019-09-11 NOTE — H&P ADULT - NSICDXFAMILYHX_GEN_ALL_CORE_FT
FAMILY HISTORY:  Sibling  Still living? No  Family history of heart failure, Age at diagnosis: Age Unknown    Aunt  Still living? No  Family history of heart disease, Age at diagnosis: Age Unknown

## 2019-09-11 NOTE — H&P ADULT - NEGATIVE NEUROLOGICAL SYMPTOMS
no syncope/no vertigo/no headache/no loss of consciousness/no generalized seizures/no tremors/no transient paralysis/no paresthesias

## 2019-09-12 LAB
ANION GAP SERPL CALC-SCNC: 16 MMO/L — HIGH (ref 7–14)
BUN SERPL-MCNC: 17 MG/DL — SIGNIFICANT CHANGE UP (ref 7–23)
CALCIUM SERPL-MCNC: 8.9 MG/DL — SIGNIFICANT CHANGE UP (ref 8.4–10.5)
CHLORIDE SERPL-SCNC: 102 MMOL/L — SIGNIFICANT CHANGE UP (ref 98–107)
CO2 SERPL-SCNC: 22 MMOL/L — SIGNIFICANT CHANGE UP (ref 22–31)
CREAT SERPL-MCNC: 0.74 MG/DL — SIGNIFICANT CHANGE UP (ref 0.5–1.3)
GLUCOSE BLDC GLUCOMTR-MCNC: 105 MG/DL — HIGH (ref 70–99)
GLUCOSE BLDC GLUCOMTR-MCNC: 115 MG/DL — HIGH (ref 70–99)
GLUCOSE BLDC GLUCOMTR-MCNC: 132 MG/DL — HIGH (ref 70–99)
GLUCOSE BLDC GLUCOMTR-MCNC: 98 MG/DL — SIGNIFICANT CHANGE UP (ref 70–99)
GLUCOSE SERPL-MCNC: 104 MG/DL — HIGH (ref 70–99)
HCT VFR BLD CALC: 43.4 % — SIGNIFICANT CHANGE UP (ref 39–50)
HGB BLD-MCNC: 13.6 G/DL — SIGNIFICANT CHANGE UP (ref 13–17)
MCHC RBC-ENTMCNC: 25.5 PG — LOW (ref 27–34)
MCHC RBC-ENTMCNC: 31.3 % — LOW (ref 32–36)
MCV RBC AUTO: 81.3 FL — SIGNIFICANT CHANGE UP (ref 80–100)
NRBC # FLD: 0 K/UL — SIGNIFICANT CHANGE UP (ref 0–0)
PLATELET # BLD AUTO: 195 K/UL — SIGNIFICANT CHANGE UP (ref 150–400)
PMV BLD: 11.5 FL — SIGNIFICANT CHANGE UP (ref 7–13)
POTASSIUM SERPL-MCNC: 3.4 MMOL/L — LOW (ref 3.5–5.3)
POTASSIUM SERPL-SCNC: 3.4 MMOL/L — LOW (ref 3.5–5.3)
RBC # BLD: 5.34 M/UL — SIGNIFICANT CHANGE UP (ref 4.2–5.8)
RBC # FLD: 14.9 % — HIGH (ref 10.3–14.5)
SODIUM SERPL-SCNC: 140 MMOL/L — SIGNIFICANT CHANGE UP (ref 135–145)
WBC # BLD: 6.18 K/UL — SIGNIFICANT CHANGE UP (ref 3.8–10.5)
WBC # FLD AUTO: 6.18 K/UL — SIGNIFICANT CHANGE UP (ref 3.8–10.5)

## 2019-09-12 PROCEDURE — 99233 SBSQ HOSP IP/OBS HIGH 50: CPT

## 2019-09-12 PROCEDURE — 93010 ELECTROCARDIOGRAM REPORT: CPT

## 2019-09-12 RX ORDER — METOPROLOL TARTRATE 50 MG
100 TABLET ORAL
Refills: 0 | Status: DISCONTINUED | OUTPATIENT
Start: 2019-09-12 | End: 2019-09-13

## 2019-09-12 RX ORDER — LOSARTAN POTASSIUM 100 MG/1
100 TABLET, FILM COATED ORAL DAILY
Refills: 0 | Status: DISCONTINUED | OUTPATIENT
Start: 2019-09-12 | End: 2019-09-13

## 2019-09-12 RX ORDER — POTASSIUM CHLORIDE 20 MEQ
40 PACKET (EA) ORAL ONCE
Refills: 0 | Status: COMPLETED | OUTPATIENT
Start: 2019-09-12 | End: 2019-09-12

## 2019-09-12 RX ADMIN — Medication 81 MILLIGRAM(S): at 12:03

## 2019-09-12 RX ADMIN — ATORVASTATIN CALCIUM 80 MILLIGRAM(S): 80 TABLET, FILM COATED ORAL at 21:39

## 2019-09-12 RX ADMIN — Medication 100 MILLIGRAM(S): at 12:40

## 2019-09-12 RX ADMIN — Medication 40 MILLIEQUIVALENT(S): at 12:40

## 2019-09-12 RX ADMIN — HEPARIN SODIUM 5000 UNIT(S): 5000 INJECTION INTRAVENOUS; SUBCUTANEOUS at 14:23

## 2019-09-12 RX ADMIN — TAMSULOSIN HYDROCHLORIDE 0.4 MILLIGRAM(S): 0.4 CAPSULE ORAL at 21:39

## 2019-09-12 RX ADMIN — HEPARIN SODIUM 5000 UNIT(S): 5000 INJECTION INTRAVENOUS; SUBCUTANEOUS at 05:36

## 2019-09-12 RX ADMIN — PANTOPRAZOLE SODIUM 40 MILLIGRAM(S): 20 TABLET, DELAYED RELEASE ORAL at 05:36

## 2019-09-12 RX ADMIN — LOSARTAN POTASSIUM 100 MILLIGRAM(S): 100 TABLET, FILM COATED ORAL at 20:24

## 2019-09-12 RX ADMIN — HEPARIN SODIUM 5000 UNIT(S): 5000 INJECTION INTRAVENOUS; SUBCUTANEOUS at 21:39

## 2019-09-12 RX ADMIN — FINASTERIDE 5 MILLIGRAM(S): 5 TABLET, FILM COATED ORAL at 12:03

## 2019-09-12 NOTE — PROGRESS NOTE ADULT - SUBJECTIVE AND OBJECTIVE BOX
Patient is a 69y old  Male who presents with a chief complaint of Ataxia (12 Sep 2019 08:58)      SUBJECTIVE / OVERNIGHT EVENTS:  Patient has no new complaints. Denies cp, SOB, abdominal pain, N/V/D     MEDICATIONS  (STANDING):  aspirin enteric coated 81 milliGRAM(s) Oral daily  atorvastatin 80 milliGRAM(s) Oral at bedtime  dextrose 5%. 1000 milliLiter(s) (50 mL/Hr) IV Continuous <Continuous>  dextrose 50% Injectable 12.5 Gram(s) IV Push once  dextrose 50% Injectable 25 Gram(s) IV Push once  dextrose 50% Injectable 25 Gram(s) IV Push once  finasteride 5 milliGRAM(s) Oral daily  heparin  Injectable 5000 Unit(s) SubCutaneous three times a day  influenza   Vaccine 0.5 milliLiter(s) IntraMuscular once  insulin lispro (HumaLOG) corrective regimen sliding scale   SubCutaneous three times a day before meals  insulin lispro (HumaLOG) corrective regimen sliding scale   SubCutaneous at bedtime  losartan 100 milliGRAM(s) Oral daily  metoprolol tartrate 100 milliGRAM(s) Oral two times a day  pantoprazole    Tablet 40 milliGRAM(s) Oral before breakfast  potassium chloride   Powder 40 milliEquivalent(s) Oral once  tamsulosin 0.4 milliGRAM(s) Oral at bedtime    MEDICATIONS  (PRN):  dextrose 40% Gel 15 Gram(s) Oral once PRN Blood Glucose LESS THAN 70 milliGRAM(s)/deciliter  glucagon  Injectable 1 milliGRAM(s) IntraMuscular once PRN Glucose LESS THAN 70 milligrams/deciliter      Vital Signs Last 24 Hrs  T(C): 36.6 (12 Sep 2019 11:59), Max: 37.1 (11 Sep 2019 15:25)  T(F): 97.8 (12 Sep 2019 11:59), Max: 98.8 (11 Sep 2019 15:25)  HR: 103 (12 Sep 2019 11:59) (82 - 103)  BP: 143/91 (12 Sep 2019 11:59) (137/88 - 168/93)  BP(mean): --  RR: 16 (12 Sep 2019 11:59) (16 - 19)  SpO2: 100% (12 Sep 2019 11:59) (100% - 100%)  CAPILLARY BLOOD GLUCOSE      POCT Blood Glucose.: 115 mg/dL (12 Sep 2019 09:12)  POCT Blood Glucose.: 105 mg/dL (11 Sep 2019 22:57)  POCT Blood Glucose.: 89 mg/dL (11 Sep 2019 18:02)  POCT Blood Glucose.: 103 mg/dL (11 Sep 2019 13:02)    I&O's Summary      PHYSICAL EXAM:  GENERAL: NAD, well-developed  HEAD:  Atraumatic, Normocephalic  EYES: EOMI, PERRLA, conjunctiva and sclera clear  NECK: Supple, No JVD  CHEST/LUNG: Clear to auscultation bilaterally; No wheeze  HEART: Regular rate and rhythm; No murmurs, rubs, or gallops  ABDOMEN: Soft, Nontender, Nondistended; Bowel sounds present  EXTREMITIES:  2+ Peripheral Pulses, No clubbing, cyanosis, or edema  PSYCH: AAOx3  NEUROLOGY: non-focal  SKIN: No rashes or lesions    LABS:                        13.6   6.18  )-----------( 195      ( 12 Sep 2019 05:45 )             43.4     09-12    140  |  102  |  17  ----------------------------<  104<H>  3.4<L>   |  22  |  0.74    Ca    8.9      12 Sep 2019 05:45  Phos  3.7     09-11  Mg     2.0     09-11    TPro  7.5  /  Alb  4.5  /  TBili  0.4  /  DBili  x   /  AST  16  /  ALT  19  /  AlkPhos  81  09-10    PT/INR - ( 10 Sep 2019 21:15 )   PT: 11.7 SEC;   INR: 1.05          PTT - ( 10 Sep 2019 21:15 )  PTT:31.2 SEC  CARDIAC MARKERS ( 11 Sep 2019 11:51 )  x     / x     / 610 u/L / 3.44 ng/mL / x      CARDIAC MARKERS ( 10 Sep 2019 21:15 )  x     / x     / 256 u/L / 4.32 ng/mL / x              RADIOLOGY & ADDITIONAL TESTS:    Imaging Personally Reviewed:    Consultant(s) Notes Reviewed:      Care Discussed with Consultants/Other Providers:

## 2019-09-12 NOTE — CONSULT NOTE ADULT - ASSESSMENT
70 y/o Male with PMHx of HTN, DM, CAD s/p 4 vessel CABG, CVAs, and TIA(2015)  p/w Ataxia and right hand weakness. He was admitted to the tele floors for possible CVA. He was outside of the window for TPA. MRI was consistent with CVA. EP called for ILR.     #CVA w/ no Hx of a-fib  - Plan for ILR today.     Arnaldo Roberts MD  Cardiology Fellow - PGY 5  Text or Call: [phone]  For all New Consults and Questions:  [email or web address]   Login: [email or web address]

## 2019-09-12 NOTE — SWALLOW BEDSIDE ASSESSMENT ADULT - ASR SWALLOW ASPIRATION MONITOR
oral hygiene/position upright (90Y)/throat clearing/upper respiratory infection/cough/change of breathing pattern/gurgly voice/fever/pneumonia

## 2019-09-12 NOTE — CONSULT NOTE ADULT - SUBJECTIVE AND OBJECTIVE BOX
Patient seen and evaluated at bedside    Chief Complaint: Weakness.     HPI:  68 y/o Male with PMHx of HTN, DM, CAD s/p 4 vessel CABG, CVAs, and TIA()  p/w Ataxia and right hand weakness. Pt states that he noticed right second and third digit weakness x2 days which he assumed would resolve on its own. 9/10 4:30PM after finishing work, Pt was going down on stairs when he noticed that he was could not move his LLE. No LOC and No obvious trauma noted. pt noticed that he was tilting towards left sided.     Pt denies chest pain, SOB, CAMACHO, PND, orthopnea, palpitations, diaphoresis, lightheadedness, dizziness, syncope, increased lowr extremity edema, fever chills, malaise, myalgias, anorexia, weight changes ( loss or gain), night sweats, generalized fatigue abdominal pain, N/V/C/D, melena, urinary symptoms, cough, and wheezing.    He was admitted to the tele floors for possible CVA. He was outside of the window for TPA. MRI was consistent with CVA. EP called for ILR.       PMHx:   Syncope and collapse  Neoplasm  Hypercholesterolemia  Vertigo  TIA (transient ischemic attack)  CAD (coronary artery disease)  Diabetes  Hypertension      PSHx:   Gastric mass  History of appendectomy  S/P angioplasty  S/P CABG x 3      Allergies:  No Known Allergies      Home Meds:    Current Medications:   aspirin enteric coated 81 milliGRAM(s) Oral daily  atorvastatin 80 milliGRAM(s) Oral at bedtime  dextrose 40% Gel 15 Gram(s) Oral once PRN  dextrose 5%. 1000 milliLiter(s) IV Continuous <Continuous>  dextrose 50% Injectable 12.5 Gram(s) IV Push once  dextrose 50% Injectable 25 Gram(s) IV Push once  dextrose 50% Injectable 25 Gram(s) IV Push once  finasteride 5 milliGRAM(s) Oral daily  glucagon  Injectable 1 milliGRAM(s) IntraMuscular once PRN  heparin  Injectable 5000 Unit(s) SubCutaneous three times a day  influenza   Vaccine 0.5 milliLiter(s) IntraMuscular once  insulin lispro (HumaLOG) corrective regimen sliding scale   SubCutaneous three times a day before meals  insulin lispro (HumaLOG) corrective regimen sliding scale   SubCutaneous at bedtime  losartan 100 milliGRAM(s) Oral daily  metoprolol tartrate 100 milliGRAM(s) Oral two times a day  pantoprazole    Tablet 40 milliGRAM(s) Oral before breakfast  tamsulosin 0.4 milliGRAM(s) Oral at bedtime      FAMILY HISTORY:  Family history of heart disease (Aunt)  Family history of heart failure (Sibling)      Social History:  Smoking History:  Alcohol Use:  Drug Use:    REVIEW OF SYSTEMS:  Constitutional:     [ ] negative [ ] fevers [ ] chills [ ] weight loss [ ] weight gain  HEENT:                  [ ] negative [ ] dry eyes [ ] eye irritation [ ] postnasal drip [ ] nasal congestion  CV:                         [ ] negative  [ ] chest pain [ ] orthopnea [ ] palpitations [ ] murmur  Resp:                     [ ] negative [ ] cough [ ] shortness of breath [ ] dyspnea [ ] wheezing [ ] sputum [ ]hemoptysis  GI:                          [ ] negative [ ] nausea [ ] vomiting [ ] diarrhea [ ] constipation [ ] abd pain [ ] dysphagia   :                        [ ] negative [ ] dysuria [ ] nocturia [ ] hematuria [ ] increased urinary frequency  Musculoskeletal: [ ] negative [ ] back pain [ ] myalgias [ ] arthralgias [ ] fracture  Skin:                       [ ] negative [ ] rash [ ] itch  Neurological:        [ ] negative [ ] headache [ ] dizziness [ ] syncope [ ] weakness [ ] numbness  Psychiatric:           [ ] negative [ ] anxiety [ ] depression  Endocrine:            [ ] negative [ ] diabetes [ ] thyroid problem  Heme/Lymph:      [ ] negative [ ] anemia [ ] bleeding problem  Allergic/Immune: [ ] negative [ ] itchy eyes [ ] nasal discharge [ ] hives [ ] angioedema    [ ] All other systems negative  [ ] Unable to assess ROS due to      Physical Exam:  T(F): 97.8 (), Max: 98.8 ()  HR: 97 () (82 - 103)  BP: 149/94 () (137/88 - 168/93)  RR: 16 ()  SpO2: 100% ()  GENERAL: No acute distress, well-developed  HEAD:  Atraumatic, Normocephalic  ENT: EOMI, PERRLA, conjunctiva and sclera clear, Neck supple, No JVD, moist mucosa  CHEST/LUNG: Clear to auscultation bilaterally; No wheeze, equal breath sounds bilaterally   BACK: No spinal tenderness  HEART: Regular rate and rhythm; No murmurs, rubs, or gallops  ABDOMEN: Soft, Nontender, Nondistended; Bowel sounds present  EXTREMITIES:  No clubbing, cyanosis, or edema  PSYCH: Nl behavior, nl affect  NEUROLOGY: AAOx3, non-focal, cranial nerves intact  SKIN: Normal color, No rashes or lesions  LINES:    Cardiovascular Diagnostic Testing:    ECG: Personally reviewed: Sinus     Echo: Personally reviewed:  < from: Transthoracic Echocardiogram (19 @ 20:21) >  CONCLUSIONS:  1. Calcified trileaflet aortic valve with normal opening.  Peak transaortic valve gradient equals 4 mm Hg. Mild aortic  regurgitation.  2. Increased relative wall thickness with normal left  ventricular mass index, consistent with concentric left  ventricular remodeling.  3. Hyperdynamic left ventricle.  4. The right ventricle is not well visualized; grossly  normal right ventricular systolic function.  *** Compared with echocardiogram of 2015, no  significant changes noted.    < end of copied text >    Stress Testing:  < from: Nuclear Stress Test-Pharmacologic (06.04.15 @ 09:07) >  IMPRESSIONS:Normal Study  * The left ventricle was normal in size.  * Tracer uptake was homogeneous throughout the left  ventricle.  * Normal study; no evidence for myocardial infarction or  ischemia.  * Gated wall motion analysis was performed, and shows  normal wall motion.    < end of copied text >      Imaging:  < from: MR Head No Cont (19 @ 11:53) >  IMPRESSION:    Acute infarct involving the left frontal white matter.    Chronic basal ganglia, left thalamic and right pontine lacunae.    Moderate to marked microvascular-type changes.    Vertebrobasilar dolichoectasia.    < end of copied text >    CXR: Personally reviewed    Labs: Personally reviewed                        13.6   6.18  )-----------( 195      ( 12 Sep 2019 05:45 )             43.4     -    140  |  102  |  17  ----------------------------<  104<H>  3.4<L>   |  22  |  0.74    Ca    8.9      12 Sep 2019 05:45  Phos  3.7     -  Mg     2.0     -    TPro  7.5  /  Alb  4.5  /  TBili  0.4  /  DBili  x   /  AST  16  /  ALT  19  /  AlkPhos  81  09-10    PT/INR - ( 10 Sep 2019 21:15 )   PT: 11.7 SEC;   INR: 1.05          PTT - ( 10 Sep 2019 21:15 )  PTT:31.2 SEC    Total Cholesterol: 139  LDL: 57  HDL: 74  T  Total Cholesterol: 138  LDL: 63  HDL: 74  T    Hemoglobin A1C, Whole Blood: 6.3 % ( @ 11:51)  Hemoglobin A1C, Whole Blood: 6.4 % ( @ 06:05)

## 2019-09-12 NOTE — SWALLOW BEDSIDE ASSESSMENT ADULT - SWALLOW EVAL: RECOMMENDED FEEDING/EATING TECHNIQUES
allow for swallow between intakes/maintain upright posture during/after eating for 30 mins/oral hygiene/small sips/bites/no straws

## 2019-09-12 NOTE — CHART NOTE - NSCHARTNOTEFT_GEN_A_CORE
Recommendations:  [ ] ILR  [ ] Upon discharge, please follow up at [address] [facility] with Maria Del Rosario Barnett NP  [phone]  Please email [email or web address] to schedule an appointment with the stroke NP for 1 week after discharge    to jeane/w Dr. Libman Impression. 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 to schedule an appointment with the stroke NP for 1 week after discharge     d/w Dr. Libman    Stroke attending. Agree with above

## 2019-09-12 NOTE — CHART NOTE - NSCHARTNOTEFT_GEN_A_CORE
Patient s/p ILR implant against anterior chest wall.  Site with no hematoma or active bleed.  Patient without complaint. Will continue to monitor.

## 2019-09-12 NOTE — SWALLOW BEDSIDE ASSESSMENT ADULT - SWALLOW EVAL: DIAGNOSIS
Patient demonstrated functional oral and pharyngeal stages of swallowing characterized by adequate bolus collection, manipulation and transfer, timely initiation of the pharyngeal trigger, adequate laryngeal elevation upon palpation and no overt, clinical s/s of laryngeal penetration/aspiration across trials of puree, regular solids and thin liquids self-administered via single cup sip as well as consecutive cup sip drinking x 4 oz.

## 2019-09-12 NOTE — SWALLOW BEDSIDE ASSESSMENT ADULT - COMMENTS
70 y/o Male w/ hx HTN, HLD, DM, ?arthritis, GI tumor s/p resection ,CAD s/p quad CABG, prior silent CVAs, TIA (2015) p/w Ataxia and right hand weakness. pt is being admitted for CVA work up.    Patient was received awake, alert and oriented to all concepts. Able to follow simple directives and communicate basic wants/needs. Primary RN present at bedside.

## 2019-09-13 ENCOUNTER — TRANSCRIPTION ENCOUNTER (OUTPATIENT)
Age: 69
End: 2019-09-13

## 2019-09-13 VITALS
RESPIRATION RATE: 16 BRPM | OXYGEN SATURATION: 98 % | HEART RATE: 82 BPM | DIASTOLIC BLOOD PRESSURE: 86 MMHG | TEMPERATURE: 98 F | SYSTOLIC BLOOD PRESSURE: 150 MMHG

## 2019-09-13 DIAGNOSIS — Z71.89 OTHER SPECIFIED COUNSELING: ICD-10-CM

## 2019-09-13 LAB
ANION GAP SERPL CALC-SCNC: 14 MMO/L — SIGNIFICANT CHANGE UP (ref 7–14)
BASOPHILS # BLD AUTO: 0.04 K/UL — SIGNIFICANT CHANGE UP (ref 0–0.2)
BASOPHILS NFR BLD AUTO: 0.5 % — SIGNIFICANT CHANGE UP (ref 0–2)
BUN SERPL-MCNC: 19 MG/DL — SIGNIFICANT CHANGE UP (ref 7–23)
CALCIUM SERPL-MCNC: 9.2 MG/DL — SIGNIFICANT CHANGE UP (ref 8.4–10.5)
CHLORIDE SERPL-SCNC: 103 MMOL/L — SIGNIFICANT CHANGE UP (ref 98–107)
CO2 SERPL-SCNC: 22 MMOL/L — SIGNIFICANT CHANGE UP (ref 22–31)
CREAT SERPL-MCNC: 0.84 MG/DL — SIGNIFICANT CHANGE UP (ref 0.5–1.3)
EOSINOPHIL # BLD AUTO: 0.2 K/UL — SIGNIFICANT CHANGE UP (ref 0–0.5)
EOSINOPHIL NFR BLD AUTO: 2.5 % — SIGNIFICANT CHANGE UP (ref 0–6)
GLUCOSE BLDC GLUCOMTR-MCNC: 110 MG/DL — HIGH (ref 70–99)
GLUCOSE BLDC GLUCOMTR-MCNC: 144 MG/DL — HIGH (ref 70–99)
GLUCOSE SERPL-MCNC: 105 MG/DL — HIGH (ref 70–99)
HCT VFR BLD CALC: 45.8 % — SIGNIFICANT CHANGE UP (ref 39–50)
HGB BLD-MCNC: 14.4 G/DL — SIGNIFICANT CHANGE UP (ref 13–17)
IMM GRANULOCYTES NFR BLD AUTO: 0.1 % — SIGNIFICANT CHANGE UP (ref 0–1.5)
LYMPHOCYTES # BLD AUTO: 2.06 K/UL — SIGNIFICANT CHANGE UP (ref 1–3.3)
LYMPHOCYTES # BLD AUTO: 25.8 % — SIGNIFICANT CHANGE UP (ref 13–44)
MAGNESIUM SERPL-MCNC: 2 MG/DL — SIGNIFICANT CHANGE UP (ref 1.6–2.6)
MCHC RBC-ENTMCNC: 25.7 PG — LOW (ref 27–34)
MCHC RBC-ENTMCNC: 31.4 % — LOW (ref 32–36)
MCV RBC AUTO: 81.6 FL — SIGNIFICANT CHANGE UP (ref 80–100)
MONOCYTES # BLD AUTO: 0.63 K/UL — SIGNIFICANT CHANGE UP (ref 0–0.9)
MONOCYTES NFR BLD AUTO: 7.9 % — SIGNIFICANT CHANGE UP (ref 2–14)
NEUTROPHILS # BLD AUTO: 5.04 K/UL — SIGNIFICANT CHANGE UP (ref 1.8–7.4)
NEUTROPHILS NFR BLD AUTO: 63.2 % — SIGNIFICANT CHANGE UP (ref 43–77)
NRBC # FLD: 0 K/UL — SIGNIFICANT CHANGE UP (ref 0–0)
PLATELET # BLD AUTO: 202 K/UL — SIGNIFICANT CHANGE UP (ref 150–400)
PMV BLD: 11.1 FL — SIGNIFICANT CHANGE UP (ref 7–13)
POTASSIUM SERPL-MCNC: 4 MMOL/L — SIGNIFICANT CHANGE UP (ref 3.5–5.3)
POTASSIUM SERPL-SCNC: 4 MMOL/L — SIGNIFICANT CHANGE UP (ref 3.5–5.3)
RBC # BLD: 5.61 M/UL — SIGNIFICANT CHANGE UP (ref 4.2–5.8)
RBC # FLD: 14.9 % — HIGH (ref 10.3–14.5)
SODIUM SERPL-SCNC: 139 MMOL/L — SIGNIFICANT CHANGE UP (ref 135–145)
WBC # BLD: 7.98 K/UL — SIGNIFICANT CHANGE UP (ref 3.8–10.5)
WBC # FLD AUTO: 7.98 K/UL — SIGNIFICANT CHANGE UP (ref 3.8–10.5)

## 2019-09-13 PROCEDURE — 99232 SBSQ HOSP IP/OBS MODERATE 35: CPT | Mod: GC

## 2019-09-13 PROCEDURE — 99239 HOSP IP/OBS DSCHRG MGMT >30: CPT

## 2019-09-13 RX ORDER — ROSUVASTATIN CALCIUM 5 MG/1
0 TABLET ORAL
Qty: 90 | Refills: 0 | DISCHARGE

## 2019-09-13 RX ORDER — CLOPIDOGREL BISULFATE 75 MG/1
75 TABLET, FILM COATED ORAL DAILY
Refills: 0 | Status: DISCONTINUED | OUTPATIENT
Start: 2019-09-13 | End: 2019-09-13

## 2019-09-13 RX ORDER — LOSARTAN POTASSIUM 100 MG/1
0 TABLET, FILM COATED ORAL
Qty: 0 | Refills: 0 | DISCHARGE

## 2019-09-13 RX ORDER — ATORVASTATIN CALCIUM 80 MG/1
1 TABLET, FILM COATED ORAL
Qty: 30 | Refills: 0
Start: 2019-09-13 | End: 2019-10-12

## 2019-09-13 RX ORDER — CLOPIDOGREL BISULFATE 75 MG/1
1 TABLET, FILM COATED ORAL
Qty: 21 | Refills: 0
Start: 2019-09-13 | End: 2019-10-03

## 2019-09-13 RX ADMIN — PANTOPRAZOLE SODIUM 40 MILLIGRAM(S): 20 TABLET, DELAYED RELEASE ORAL at 06:17

## 2019-09-13 RX ADMIN — CLOPIDOGREL BISULFATE 75 MILLIGRAM(S): 75 TABLET, FILM COATED ORAL at 12:56

## 2019-09-13 RX ADMIN — Medication 100 MILLIGRAM(S): at 00:43

## 2019-09-13 RX ADMIN — Medication 100 MILLIGRAM(S): at 12:56

## 2019-09-13 RX ADMIN — FINASTERIDE 5 MILLIGRAM(S): 5 TABLET, FILM COATED ORAL at 11:51

## 2019-09-13 RX ADMIN — HEPARIN SODIUM 5000 UNIT(S): 5000 INJECTION INTRAVENOUS; SUBCUTANEOUS at 06:17

## 2019-09-13 RX ADMIN — Medication 81 MILLIGRAM(S): at 11:51

## 2019-09-13 NOTE — DISCHARGE NOTE NURSING/CASE MANAGEMENT/SOCIAL WORK - PATIENT PORTAL LINK FT
You can access the FollowMyHealth Patient Portal offered by [facility] by registering at the following website: [email or web address]. By joining [facility]’s FollowMyHealth portal, you will also be able to view your health information using other applications (apps) compatible with our system.

## 2019-09-13 NOTE — DISCHARGE NOTE PROVIDER - HOSPITAL COURSE
70 y/o Male, with a PmHx of HTN, HLD, DM, Arthritis, GI tumor s/p resection, CAD s/p quad CABG, prior silent CVAs, TIA (2015), presents with Ataxia and right hand weakness. Admitted to telemetry for CVA work up.        Hospital course:    + Ataxia w/Right hand weakness - NIHSS: 3; MRS: 0;      + Acute infact of the left frontal white matter on MRI        1. CVA - NIHSS: 3, MRS: 0    9/10 CXR - clear lungs    9/10 CT Head - no ct evidence of an acute territorial infarct. No acute intracranial bleeding, mass effect, or shift.    9/10 CTA Head/Neck -  Patent intracranial circulation. No flow limiting stenosis or occlusion. Patent cervical vasculature. No flow limiting stenosis or occlusion. Severe atherosclerotic disease involving the proximal right vertebral artery near the basilar tip.     - dysphagia screen passed , placed on type 1 dysphagia  diet    9/11 MRI Head - acute infarct involving the left frontal white matter. Chronic basal ganglia, left thalamic and right pontine lacunae. Moderate to marked microvascular type changes. Vertebrobasilar dolichoectasia.    9/12 - echo - EF 65-70%  1. Calcified trileaflet aortic valve with normal opening. Peak transaortic valve gradient equals 4 mm Hg. Mild aortic regurgitation. 2. Increased relative wall thickness with normal left ventricular mass index, consistent with concentric left ventricular remodeling. 3. Hyperdynamic left ventricle. 4. The right ventricle is not well visualized; grossly normal right ventricular systolic function. *** Compared with echocardiogram of 6/12/2015, no significant changes noted.    - s/p ILR implant 9/12 followup with device clinic on 9/26 11am    - PT/OT rehab    - followup with neurology NP Maria Del Rosario Barnett in 1 week                 2. Hypertension    - permissive HTN now BP controlled    - DASH/TLC diet.         3. DM    - monitor fs, insulins ssc        Reviewed discharge medications with patient; All new medications requiring new prescription sent to pharmacy of patients choice. Reviewed need for prescription for previous home medications and new prescriptions sent if requested. Patient in agreement and understands. As per Dr Marquez pt cleared for discharge on 9/13.

## 2019-09-13 NOTE — PROGRESS NOTE ADULT - PROBLEM SELECTOR PLAN 5
- Continue statin  - Low fat/ low cholesterol diet

## 2019-09-13 NOTE — PROGRESS NOTE ADULT - PROBLEM SELECTOR PLAN 4
BPs better controlled  c/w metoprolol and losartan  - DASH/TLC diet
BPs elevated  restarted metoprolol and losartan  - monitoring BPs  - DASH/TLC diet
- Hold Home BP meds 2/2 to permissive htn x24 hrs  - BP meds could be resumed after 24hrs- 48hrs  - monitor BP   - DASH/TLC diet

## 2019-09-13 NOTE — PROGRESS NOTE ADULT - SUBJECTIVE AND OBJECTIVE BOX
Patient seen and examined at bedside.    Overnight Events: No acute events overnight.       REVIEW OF SYSTEMS:  Constitutional:     [ ] negative [ ] fevers [ ] chills [ ] weight loss [ ] weight gain  HEENT:                  [ ] negative [ ] dry eyes [ ] eye irritation [ ] postnasal drip [ ] nasal congestion  CV:                         [ ] negative  [ ] chest pain [ ] orthopnea [ ] palpitations [ ] murmur  Resp:                     [ ] negative [ ] cough [ ] shortness of breath [ ] dyspnea [ ] wheezing [ ] sputum [ ]hemoptysis  GI:                          [ ] negative [ ] nausea [ ] vomiting [ ] diarrhea [ ] constipation [ ] abd pain [ ] dysphagia   :                        [ ] negative [ ] dysuria [ ] nocturia [ ] hematuria [ ] increased urinary frequency  Musculoskeletal: [ ] negative [ ] back pain [ ] myalgias [ ] arthralgias [ ] fracture  Skin:                       [ ] negative [ ] rash [ ] itch  Neurological:        [ ] negative [ ] headache [ ] dizziness [ ] syncope [ ] weakness [ ] numbness  Psychiatric:           [ ] negative [ ] anxiety [ ] depression  Endocrine:            [ ] negative [ ] diabetes [ ] thyroid problem  Heme/Lymph:      [ ] negative [ ] anemia [ ] bleeding problem  Allergic/Immune: [ ] negative [ ] itchy eyes [ ] nasal discharge [ ] hives [ ] angioedema    [x ] All other systems negative  [ ] Unable to assess ROS due to    Current Meds:  aspirin enteric coated 81 milliGRAM(s) Oral daily  atorvastatin 80 milliGRAM(s) Oral at bedtime  dextrose 40% Gel 15 Gram(s) Oral once PRN  dextrose 5%. 1000 milliLiter(s) IV Continuous <Continuous>  dextrose 50% Injectable 12.5 Gram(s) IV Push once  dextrose 50% Injectable 25 Gram(s) IV Push once  dextrose 50% Injectable 25 Gram(s) IV Push once  finasteride 5 milliGRAM(s) Oral daily  glucagon  Injectable 1 milliGRAM(s) IntraMuscular once PRN  heparin  Injectable 5000 Unit(s) SubCutaneous three times a day  influenza   Vaccine 0.5 milliLiter(s) IntraMuscular once  insulin lispro (HumaLOG) corrective regimen sliding scale   SubCutaneous three times a day before meals  insulin lispro (HumaLOG) corrective regimen sliding scale   SubCutaneous at bedtime  losartan 100 milliGRAM(s) Oral daily  metoprolol tartrate 100 milliGRAM(s) Oral two times a day  pantoprazole    Tablet 40 milliGRAM(s) Oral before breakfast  tamsulosin 0.4 milliGRAM(s) Oral at bedtime      PAST MEDICAL & SURGICAL HISTORY:  Syncope and collapse: 6/2015  Neoplasm: abdominal  Hypercholesterolemia  Vertigo  TIA (transient ischemic attack): March 2015- was admitted at [facility] (coronary artery disease)  Diabetes  Hypertension  Gastric mass: resection 2 weeks ago  History of appendectomy: 1969  S/P angioplasty: 2008 (prior to CABG x3)  S/P CABG x 3: 2008      Vitals:  T(F): 97.9 (09-13), Max: 98.7 (09-13)  HR: 71 (09-13) (71 - 103)  BP: 147/95 (09-13) (120/77 - 184/100)  RR: 17 (09-13)  SpO2: 97% (09-13)  I&O's Summary    12 Sep 2019 07:01  -  13 Sep 2019 07:00  --------------------------------------------------------  IN: 0 mL / OUT: 200 mL / NET: -200 mL        Physical Exam:  Appearance: No acute distress; well appearing  Eyes: PERRL, EOMI, pink conjunctiva  HENT: Normal oral mucosa  Cardiovascular: RRR, S1, S2, no murmurs, rubs, or gallops; no edema; no JVD  Respiratory: Clear to auscultation bilaterally  Gastrointestinal: soft, non-tender, non-distended with normal bowel sounds  Musculoskeletal: No clubbing; no joint deformity   Neurologic: Non-focal  Lymphatic: No lymphadenopathy  Psychiatry: AAOx3, mood & affect appropriate  Skin: No rashes, ecchymoses, or cyanosis                          14.4   7.98  )-----------( 202      ( 13 Sep 2019 06:10 )             45.8     09-13    139  |  103  |  19  ----------------------------<  105<H>  4.0   |  22  |  0.84    Ca    9.2      13 Sep 2019 06:10  Phos  3.7     09-11  Mg     2.0     09-13        CARDIAC MARKERS ( 11 Sep 2019 11:51 )  x     / x     / 610 u/L / 3.44 ng/mL / x            New ECG(s): Personally reviewed    Echo:  < from: Transthoracic Echocardiogram (09.11.19 @ 20:21) >  1. Calcified trileaflet aortic valve with normal opening.  Peak transaortic valve gradient equals 4 mm Hg. Mild aortic  regurgitation.  2. Increased relative wall thickness with normal left  ventricular mass index, consistent with concentric left  ventricular remodeling.  3. Hyperdynamic left ventricle.  4. The right ventricle is not well visualized; grossly  normal right ventricular systolic function.  *** Compared with echocardiogram of 6/12/2015, no  significant changes noted.    < end of copied text >    Interpretation of Telemetry: Sinus 70s

## 2019-09-13 NOTE — PROGRESS NOTE ADULT - PROBLEM SELECTOR PLAN 3
- Continue ASA, statin   - Low fat, low cholesterol diet

## 2019-09-13 NOTE — PROGRESS NOTE ADULT - SUBJECTIVE AND OBJECTIVE BOX
Patient is a 69y old  Male who presents with a chief complaint of Ataxia (13 Sep 2019 11:50)      SUBJECTIVE / OVERNIGHT EVENTS:  Patient has no new complaints. Denies cp, SOB, abdominal pain, N/V/D      MEDICATIONS  (STANDING):  aspirin enteric coated 81 milliGRAM(s) Oral daily  atorvastatin 80 milliGRAM(s) Oral at bedtime  clopidogrel Tablet 75 milliGRAM(s) Oral daily  dextrose 5%. 1000 milliLiter(s) (50 mL/Hr) IV Continuous <Continuous>  dextrose 50% Injectable 12.5 Gram(s) IV Push once  dextrose 50% Injectable 25 Gram(s) IV Push once  dextrose 50% Injectable 25 Gram(s) IV Push once  finasteride 5 milliGRAM(s) Oral daily  heparin  Injectable 5000 Unit(s) SubCutaneous three times a day  influenza   Vaccine 0.5 milliLiter(s) IntraMuscular once  insulin lispro (HumaLOG) corrective regimen sliding scale   SubCutaneous three times a day before meals  insulin lispro (HumaLOG) corrective regimen sliding scale   SubCutaneous at bedtime  losartan 100 milliGRAM(s) Oral daily  metoprolol tartrate 100 milliGRAM(s) Oral two times a day  pantoprazole    Tablet 40 milliGRAM(s) Oral before breakfast  tamsulosin 0.4 milliGRAM(s) Oral at bedtime    MEDICATIONS  (PRN):  dextrose 40% Gel 15 Gram(s) Oral once PRN Blood Glucose LESS THAN 70 milliGRAM(s)/deciliter  glucagon  Injectable 1 milliGRAM(s) IntraMuscular once PRN Glucose LESS THAN 70 milligrams/deciliter      Vital Signs Last 24 Hrs  T(C): 36.6 (13 Sep 2019 12:42), Max: 37.1 (13 Sep 2019 04:40)  T(F): 97.9 (13 Sep 2019 12:42), Max: 98.7 (13 Sep 2019 04:40)  HR: 82 (13 Sep 2019 12:42) (71 - 93)  BP: 150/86 (13 Sep 2019 12:42) (120/77 - 184/100)  BP(mean): --  RR: 16 (13 Sep 2019 12:42) (16 - 18)  SpO2: 98% (13 Sep 2019 12:42) (95% - 100%)  CAPILLARY BLOOD GLUCOSE      POCT Blood Glucose.: 110 mg/dL (13 Sep 2019 12:46)  POCT Blood Glucose.: 144 mg/dL (13 Sep 2019 11:01)  POCT Blood Glucose.: 132 mg/dL (12 Sep 2019 22:05)  POCT Blood Glucose.: 105 mg/dL (12 Sep 2019 16:46)    I&O's Summary    12 Sep 2019 07:01  -  13 Sep 2019 07:00  --------------------------------------------------------  IN: 0 mL / OUT: 200 mL / NET: -200 mL        PHYSICAL EXAM:  GENERAL: NAD, well-developed  HEAD:  Atraumatic, Normocephalic  EYES: EOMI, PERRLA, conjunctiva and sclera clear  NECK: Supple, No JVD  CHEST/LUNG: Clear to auscultation bilaterally; No wheeze  HEART: Regular rate and rhythm; No murmurs, rubs, or gallops  ABDOMEN: Soft, Nontender, Nondistended; Bowel sounds present  EXTREMITIES:  2+ Peripheral Pulses, No clubbing, cyanosis, or edema  PSYCH: AAOx3  NEUROLOGY: non-focal; minimal residual right hand weakness.  SKIN: No rashes or lesions    LABS:                        14.4   7.98  )-----------( 202      ( 13 Sep 2019 06:10 )             45.8     09-13    139  |  103  |  19  ----------------------------<  105<H>  4.0   |  22  |  0.84    Ca    9.2      13 Sep 2019 06:10  Mg     2.0     09-13                RADIOLOGY & ADDITIONAL TESTS:    Imaging Personally Reviewed: < from: Transthoracic Echocardiogram (09.11.19 @ 20:21) >  CONCLUSIONS:  1. Calcified trileaflet aortic valve with normal opening.  Peak transaortic valve gradient equals 4 mm Hg. Mild aortic  regurgitation.  2. Increased relative wall thickness with normal left  ventricular mass index, consistent with concentric left  ventricular remodeling.  3. Hyperdynamic left ventricle.  4. The right ventricle is not well visualized; grossly  normal right ventricular systolic function.  *** Compared with echocardiogram of 6/12/2015, no  significant changes noted.    < end of copied text >      Consultant(s) Notes Reviewed:      Care Discussed with Consultants/Other Providers:

## 2019-09-13 NOTE — DISCHARGE NOTE PROVIDER - PROVIDER TOKENS
PROVIDER:[TOKEN:[[id number]],FOLLOWUP:[1 week]],PROVIDER:[TOKEN:[[id number]]],PROVIDER:[TOKEN:[[id number]]]

## 2019-09-13 NOTE — PROGRESS NOTE ADULT - SUBJECTIVE AND OBJECTIVE BOX
Subjective: Patient seen and examined. No new events except as noted.     SUBJECTIVE/ROS:  No chest pain, dyspnea, palpitation, or dizziness.       MEDICATIONS:  MEDICATIONS  (STANDING):  aspirin enteric coated 81 milliGRAM(s) Oral daily  atorvastatin 80 milliGRAM(s) Oral at bedtime  dextrose 5%. 1000 milliLiter(s) (50 mL/Hr) IV Continuous <Continuous>  dextrose 50% Injectable 12.5 Gram(s) IV Push once  dextrose 50% Injectable 25 Gram(s) IV Push once  dextrose 50% Injectable 25 Gram(s) IV Push once  finasteride 5 milliGRAM(s) Oral daily  heparin  Injectable 5000 Unit(s) SubCutaneous three times a day  influenza   Vaccine 0.5 milliLiter(s) IntraMuscular once  insulin lispro (HumaLOG) corrective regimen sliding scale   SubCutaneous three times a day before meals  insulin lispro (HumaLOG) corrective regimen sliding scale   SubCutaneous at bedtime  losartan 100 milliGRAM(s) Oral daily  metoprolol tartrate 100 milliGRAM(s) Oral two times a day  pantoprazole    Tablet 40 milliGRAM(s) Oral before breakfast  tamsulosin 0.4 milliGRAM(s) Oral at bedtime      PHYSICAL EXAM:  T(C): 36.6 (09-13-19 @ 08:42), Max: 37.1 (09-13-19 @ 04:40)  HR: 71 (09-13-19 @ 08:42) (71 - 103)  BP: 147/95 (09-13-19 @ 08:42) (120/77 - 184/100)  RR: 17 (09-13-19 @ 08:42) (16 - 18)  SpO2: 97% (09-13-19 @ 08:42) (95% - 100%)  Wt(kg): --  I&O's Summary    12 Sep 2019 07:01  -  13 Sep 2019 07:00  --------------------------------------------------------  IN: 0 mL / OUT: 200 mL / NET: -200 mL            JVP: Normal  Neck: supple  Lung: clear   CV: S1 S2 , Murmur:  Abd: soft  Ext: No edema  neuro: Awake / alert  Psych: flat affect  Skin: normal``    LABS/DATA:    CARDIAC MARKERS:  CARDIAC MARKERS ( 11 Sep 2019 11:51 )  x     / x     / 610 u/L / 3.44 ng/mL / x      CARDIAC MARKERS ( 10 Sep 2019 21:15 )  x     / x     / 256 u/L / 4.32 ng/mL / x                                    14.4   7.98  )-----------( 202      ( 13 Sep 2019 06:10 )             45.8     09-13    139  |  103  |  19  ----------------------------<  105<H>  4.0   |  22  |  0.84    Ca    9.2      13 Sep 2019 06:10  Phos  3.7     09-11  Mg     2.0     09-13      proBNP:   Lipid Profile:   HgA1c:   TSH:     TELE:  EKG:

## 2019-09-13 NOTE — PROGRESS NOTE ADULT - ASSESSMENT
68 y/o Male with PMHx of HTN, DM, CAD s/p 4 vessel CABG, CVAs, and TIA(2015)  p/w Ataxia and right hand weakness. He was admitted to the tele floors for possible CVA. He was outside of the window for TPA. MRI was consistent with CVA. EP called for ILR.     #CVA w/ no Hx of a-fib  - s/p ILR on 9/12/19  - Pt will get ILR teaching today.   - no further EP testing at this time.     Arnaldo Roberts MD  Cardiology Fellow - PGY 5  Text or Call: [phone]  For all New Consults and Questions:  [email or web address]   Login: [email or web address]
68 y/o Male w/ hx HTN, HLD, DM,  ?arthritis, GI tumor s/p resection ,CAD s/p quad CABG, prior silent CVAs, TIA(2015)  p/w  Ataxia and right hand weakness. pt is being admitted for CVA work up
Ataxia  CVA  likely due to vertebral artery disease   fu with MRI and neurology eval   echo unremarkable     CAD   cont asa, statin     DM  Monitor finger stick. Insulin coverage. Diabetic education and Diabetic diet. Consider nutrition consultation.    HTN  permissive HTN as per neurology
Ataxia  CVA  s/p ILR   fu with neurology   echo unremarkable     CAD   cont asa, statin     DM  Monitor finger stick. Insulin coverage. Diabetic education and Diabetic diet. Consider nutrition consultation.    HTN  cont current meds
70 y/o Male w/ hx HTN, HLD, DM,  ?arthritis, GI tumor s/p resection ,CAD s/p quad CABG, prior silent CVAs, TIA(2015)  p/w  Ataxia and right hand weakness found on MRI to have acute Left frontal CVA. ILR placed. d/c home to day with outpatient PT. D/c 40 minutes.
70 y/o Male w/ hx HTN, HLD, DM,  ?arthritis, GI tumor s/p resection ,CAD s/p quad CABG, prior silent CVAs, TIA(2015)  p/w  Ataxia and right hand weakness. pt is being admitted for CVA work up

## 2019-09-13 NOTE — DISCHARGE NOTE PROVIDER - NSDCCPCAREPLAN_GEN_ALL_CORE_FT
PRINCIPAL DISCHARGE DIAGNOSIS  Diagnosis: CVA (cerebral vascular accident)  Assessment and Plan of Treatment: you had stroke. continue aspirin indefinately and plavix for 3 weeks (starting from 9/13-10/4). Follow up with neurology NP Maria Del Rosario Barnett in 1 week. You have a loop implanted followup with device clinic on 9/26 11am device clinic ambulaotry/oncology building      SECONDARY DISCHARGE DIAGNOSES  Diagnosis: CAD (coronary artery disease)  Assessment and Plan of Treatment: Continue aspirin.  Continue low salt, fat, cholesterol and carbohydrate diet. Follow up with cardiologist in 2-3 weeks    Diagnosis: Hypertension  Assessment and Plan of Treatment: Low sodium and fat diet, continue anti-hypertensive medications, and follow up with primary care physician.    Diagnosis: Type 2 diabetes mellitus without complication, without long-term current use of insulin  Assessment and Plan of Treatment: Hypoglycemia management: please check your fingerstick every morning or if you are not feeling well. If your FS is >300mg/dl X3 or more readings please contact your PMD/Endocrinologist. If your FS is low <70mg/dl and/or you have symptoms of very low blood sugar FIRST drink1/2 cup of apple juice (or take 4 glucose tab/tube of glucose gel) and recheck FS in 15mins. Repeat these steps until blood sugar is above 100mg/dl, if NECESSARY. Then call your provider to discuss low blood sugar.   What to expend at followup appointment: please bring a log of your fingerstick and/or your glucometer to you appointment. Your blood sugar tracking will help your diabetes team determine the best plan    Diagnosis: Hypercholesterolemia  Assessment and Plan of Treatment: Low fat diet, exercise daily and continue current medications. Follow up with primary care physician and cardiologist for management.

## 2019-09-13 NOTE — CHART NOTE - NSCHARTNOTEFT_GEN_A_CORE
ELECTROPHYSIOLOGY      Patient s/p ILR implantation yesterday.  Tolerated the procedure well.  No complications.   Dressing removed.  Incision C/D/I.  No hematoma or bleeding.  No pain at the site.  Post procedure ILR teaching done.  Written instructions and contact information provided.  Patient has home monitor with instructions.    Follow up appointment in the device clinic on 9/26/2019 at 11:00am  [address] [place] [address] Oncology [facility]  [phone].

## 2019-09-13 NOTE — DISCHARGE NOTE PROVIDER - CARE PROVIDER_API CALL
Maria Del Rosario Barnett (NP; RN)  NP in Family Health  [address], [facility] [address]  [place] [address]  Phone: [phone]  Fax: [phone]  Follow Up Time: 1 week    Diaz Lundberg)  Cardiac Electrophysiology; Cardiology; Internal Medicine  [address] [place] [address]  Phone: [phone]  Fax: [phone]  Follow Up Time:     Haresh Gleason)  Cardiovascular Disease; Internal Medicine  [address], [facility] [address]  [place] [address]  Phone: [phone]  Fax: [phone]  Follow Up Time:

## 2019-09-13 NOTE — PROGRESS NOTE ADULT - PROBLEM SELECTOR PLAN 1
- mRI brain shows acute left frontal infarct  - Neuro c/s appreciated   - On CTA Neck: Severe atherosclerotic disease involving the proximal right   vertebral artery near the basilar tip.   - CTH - Negative  - TTE w/ bubble study  showed LVH  - Neuro checks q4hrs   - c/w metoprolol and losartan  - ASA 81/plavix and Statin  - patient passed swallow eval  - aspiration precautions ,fall precautions   - PT rec home with outpatient PT  -Patient s/p ILR placement 9/12
- mRI brain shows acute left frontal infarct  - Neuro c/s appreciated   - On CTA Neck: Severe atherosclerotic disease involving the proximal right   vertebral artery near the basilar tip.   - CTH - Negative  - check TTE w/ bubble study   - Neuro checks q4hrs   - can resume metoprolol and losartan  - ASA 81 and Statin  - patient passed swallow eval  - aspiration precautions ,fall precautions   - PT rec home with outpatient PT  -Patient to have ILR placed today
- mRI brain shows acute left frontal infarct  - Neuro c/s appreciated   - On CTA Neck: Severe atherosclerotic disease involving the proximal right   vertebral artery near the basilar tip.   - CTH - Negative  - check TTE w/ bubble study   - Neuro checks q4hrs   - permissive HTN x24 hrs  - ASA 81 and Statin  - dysphagia screen passed , placed on type 1 dysphagia  diet  - aspiration precautions ,fall precautions   - S&S  rec dysphagia 1 pureed diet  - PT/OT eval

## 2019-09-13 NOTE — PROGRESS NOTE ADULT - PROBLEM SELECTOR PLAN 2
- Hold oral DM meds while inpatient   - A1C  - ISS  - monitor FS  - diabetic diet

## 2019-09-26 ENCOUNTER — APPOINTMENT (OUTPATIENT)
Dept: ELECTROPHYSIOLOGY | Facility: CLINIC | Age: 69
End: 2019-09-26
Payer: MEDICARE

## 2019-09-26 DIAGNOSIS — Z86.73 PERSONAL HISTORY OF TRANSIENT ISCHEMIC ATTACK (TIA), AND CEREBRAL INFARCTION W/OUT RESIDUAL DEFICITS: ICD-10-CM

## 2019-09-26 PROCEDURE — 93285 PRGRMG DEV EVAL SCRMS IP: CPT

## 2019-10-07 ENCOUNTER — RECORD ABSTRACTING (OUTPATIENT)
Age: 69
End: 2019-10-07

## 2019-10-08 ENCOUNTER — APPOINTMENT (OUTPATIENT)
Dept: NEUROLOGY | Facility: CLINIC | Age: 69
End: 2019-10-08
Payer: MEDICARE

## 2019-10-08 VITALS
HEART RATE: 75 BPM | BODY MASS INDEX: 26.29 KG/M2 | HEIGHT: 64 IN | DIASTOLIC BLOOD PRESSURE: 94 MMHG | TEMPERATURE: 97.5 F | OXYGEN SATURATION: 98 % | SYSTOLIC BLOOD PRESSURE: 155 MMHG | WEIGHT: 154 LBS | RESPIRATION RATE: 17 BRPM

## 2019-10-08 DIAGNOSIS — Z82.3 FAMILY HISTORY OF STROKE: ICD-10-CM

## 2019-10-08 PROCEDURE — 99214 OFFICE O/P EST MOD 30 MIN: CPT

## 2019-10-08 RX ORDER — CLOPIDOGREL 75 MG/1
75 TABLET, FILM COATED ORAL
Refills: 0 | Status: DISCONTINUED | COMMUNITY
End: 2019-10-08

## 2020-01-06 ENCOUNTER — APPOINTMENT (OUTPATIENT)
Dept: NEUROLOGY | Facility: CLINIC | Age: 70
End: 2020-01-06
Payer: MEDICARE

## 2020-01-06 VITALS
DIASTOLIC BLOOD PRESSURE: 90 MMHG | BODY MASS INDEX: 26.46 KG/M2 | SYSTOLIC BLOOD PRESSURE: 161 MMHG | RESPIRATION RATE: 17 BRPM | OXYGEN SATURATION: 94 % | HEIGHT: 64 IN | TEMPERATURE: 97.9 F | HEART RATE: 86 BPM | WEIGHT: 155 LBS

## 2020-01-06 PROCEDURE — 99214 OFFICE O/P EST MOD 30 MIN: CPT

## 2020-05-11 ENCOUNTER — APPOINTMENT (OUTPATIENT)
Dept: NEUROLOGY | Facility: CLINIC | Age: 70
End: 2020-05-11
Payer: MEDICARE

## 2020-05-11 VITALS — SYSTOLIC BLOOD PRESSURE: 145 MMHG | DIASTOLIC BLOOD PRESSURE: 89 MMHG

## 2020-05-11 DIAGNOSIS — I63.9 CEREBRAL INFARCTION, UNSPECIFIED: ICD-10-CM

## 2020-05-11 PROCEDURE — 99203 OFFICE O/P NEW LOW 30 MIN: CPT | Mod: 95

## 2021-06-14 NOTE — PROGRESS NOTE ADULT - SUBJECTIVE AND OBJECTIVE BOX
Continue Morphine extended release 15 mg every 8 hours.   Continue Morphine immediate release 15 mg up to 2-3 tablets per day.    Refills sent for 01/18/2021 & 02/15/2021.  Check with your pharmacy that all prescriptions are on your profile. Call the pharmacy a week before you want to fill the prescription as stock may vary and the pharmacy may need to order the medication for you. I reserve the right to cancel the electronic prescription at the pharmacy in between appointments and would contact you with an explanation if this were to occur.  Discussed [facility] will need a prior authorization after 01/29/21 - make sure pharmacy is aware at least 3 business days prior to needing a refill so we have enough time to complete with your insurance company.  Continue use of medical cannabis as prescribed.     Continue PT as scheduled monthly  Continue with psychiatrist Dr. Ortiz and medication recommendations - follow-up in January as scheduled.  Keep visit for annual with Dr. Duron - if any lab abnormalities (specifically renal function) notify pain center.  Discussed nutrition referral for weight management as needed, OT referral for home chores/function as needed.    Follow-up in 8 weeks with Dolores delaney   Subjective: Patient seen and examined. No new events except as noted.     SUBJECTIVE/ROS:  No chest pain, dyspnea, palpitation, or dizziness.       MEDICATIONS:  MEDICATIONS  (STANDING):  aspirin enteric coated 81 milliGRAM(s) Oral daily  atorvastatin 80 milliGRAM(s) Oral at bedtime  dextrose 5%. 1000 milliLiter(s) (50 mL/Hr) IV Continuous <Continuous>  dextrose 50% Injectable 12.5 Gram(s) IV Push once  dextrose 50% Injectable 25 Gram(s) IV Push once  dextrose 50% Injectable 25 Gram(s) IV Push once  finasteride 5 milliGRAM(s) Oral daily  heparin  Injectable 5000 Unit(s) SubCutaneous three times a day  influenza   Vaccine 0.5 milliLiter(s) IntraMuscular once  insulin lispro (HumaLOG) corrective regimen sliding scale   SubCutaneous three times a day before meals  insulin lispro (HumaLOG) corrective regimen sliding scale   SubCutaneous at bedtime  pantoprazole    Tablet 40 milliGRAM(s) Oral before breakfast  tamsulosin 0.4 milliGRAM(s) Oral at bedtime      PHYSICAL EXAM:  T(C): 36.8 (09-12-19 @ 04:06), Max: 37.1 (09-11-19 @ 15:25)  HR: 83 (09-12-19 @ 04:06) (79 - 89)  BP: 145/95 (09-12-19 @ 04:06) (137/88 - 168/93)  RR: 18 (09-12-19 @ 04:06) (17 - 19)  SpO2: 100% (09-12-19 @ 04:06) (100% - 100%)  Wt(kg): --  I&O's Summary    Height (cm): 165.1 (09-12 @ 04:06)  Weight (kg): 66.3 (09-12 @ 04:06)  BMI (kg/m2): 24.3 (09-12 @ 04:06)  BSA (m2): 1.73 (09-12 @ 04:06)      JVP: Normal  Neck: supple  Lung: clear   CV: S1 S2 , Murmur:  Abd: soft  Ext: No edema  neuro: Awake / alert  Psych: flat affect  Skin: normal``    LABS/DATA:    CARDIAC MARKERS:  CARDIAC MARKERS ( 11 Sep 2019 11:51 )  x     / x     / 610 u/L / 3.44 ng/mL / x      CARDIAC MARKERS ( 10 Sep 2019 21:15 )  x     / x     / 256 u/L / 4.32 ng/mL / x                                    13.6   6.18  )-----------( 195      ( 12 Sep 2019 05:45 )             43.4     09-12    140  |  102  |  17  ----------------------------<  104<H>  3.4<L>   |  22  |  0.74    Ca    8.9      12 Sep 2019 05:45  Phos  3.7     09-11  Mg     2.0     09-11    TPro  7.5  /  Alb  4.5  /  TBili  0.4  /  DBili  x   /  AST  16  /  ALT  19  /  AlkPhos  81  09-10    proBNP:   Lipid Profile:   HgA1c: Hemoglobin A1C, Whole Blood: 6.3 % (09-11 @ 11:51)    TSH:     TELE:  EKG:    < from: Transthoracic Echocardiogram (09.11.19 @ 20:21) >  ------------------------------------------------------------------------  CONCLUSIONS:  1. Calcified trileaflet aortic valve with normal opening.  Peak transaortic valve gradient equals 4 mm Hg. Mild aortic  regurgitation.  2. Increased relative wall thickness with normal left  ventricular mass index, consistent with concentric left  ventricular remodeling.  3. Hyperdynamic left ventricle.  4. The right ventricle is not well visualized; grossly  normal right ventricular systolic function.  *** Compared with echocardiogram of 6/12/2015, no  significant changes noted.  ------------------------------------------------------------------------  Confirmed on  9/11/2019 - 22:29:40 by Jesse Hughes M.D.    < end of copied text >

## 2023-02-02 NOTE — DISCHARGE NOTE NURSING/CASE MANAGEMENT/SOCIAL WORK - NSDPLANG ASIS_GEN_ALL_CORE
Caller: Radha Barry    Relationship to patient: Self    Best call back number: [phone]    Chief complaint:BLOOD WORK/MED REFILL     Type of visit: OFFICE     Requested date: AS SOON AS POSSIBLE  10-2  PATIENT HAD TO CANCEL DUE TO 71 SHUT DOWN, PATIENT IS WORRIED ABOUT AMBIEN MEDICATION    No

## 2023-04-10 ENCOUNTER — APPOINTMENT (OUTPATIENT)
Dept: SURGERY | Facility: CLINIC | Age: 73
End: 2023-04-10
Payer: MEDICARE

## 2023-04-10 VITALS
SYSTOLIC BLOOD PRESSURE: 153 MMHG | HEIGHT: 64 IN | WEIGHT: 142 LBS | DIASTOLIC BLOOD PRESSURE: 86 MMHG | BODY MASS INDEX: 24.24 KG/M2 | HEART RATE: 71 BPM

## 2023-04-10 DIAGNOSIS — I10 ESSENTIAL (PRIMARY) HYPERTENSION: ICD-10-CM

## 2023-04-10 PROCEDURE — 99203 OFFICE O/P NEW LOW 30 MIN: CPT

## 2023-04-10 RX ORDER — AMLODIPINE BESYLATE 10 MG/1
10 TABLET ORAL
Qty: 90 | Refills: 0 | Status: ACTIVE | COMMUNITY
Start: 2022-12-28

## 2023-04-10 NOTE — ASSESSMENT
[FreeTextEntry1] : Mr. PEGUERO is a 72 year y/o M, who presents w abnormal finding seen on recent imaging, c/w gallbladder polyp,  measuring 1.0 x 0.3 cm as well as several other small polyps measuring up to 3 mm.

## 2023-04-10 NOTE — PHYSICAL EXAM
[Alert] : alert [Oriented to Person] : oriented to person [Oriented to Place] : oriented to place [Oriented to Time] : oriented to time [Calm] : calm [de-identified] : A/Ox3; NAD. appears comfortable [de-identified] : EOMI; sclera anicteric. [de-identified] : no cervical lymphadenopathy  [de-identified] : airway patent, no use of accessory muscles [de-identified] : abd is soft, NT/ND\par  [de-identified] : +ROM, normal gait  [de-identified] : multiple hypertrophic, keloidal scars to the chest wall

## 2023-04-10 NOTE — HISTORY OF PRESENT ILLNESS
[de-identified] : ASHLEE PEGUERO is a 72 year old M w PMH CAD, HTN, BPH, T2DM (diet controlled) who is referred to the office for consultation visit, he presents w the cc of having gallbladder issues. Patient S/P CABG, approx 10 years ago. He is also S/P robotic resection of gastrointestinal stromal tumor, 2015. He also has history of stroke, 2019. \par Today, he presents to the office for evaluation of gallbladder polyp, found on recent imaging. Patient had an abdominal US done for loss of appetite, 09/09/22 c/w multiple probable gallbladder polyps. MRI of the abdomen was done 01/17/23 c/w elongated gallbladder polyp measuring 1.0 x 0.3 cm as well as several other small polyps measuring up to 3 mm.\par Patient is asymptomatic. He denies any recent weight loss /loss of appetite.

## 2023-04-10 NOTE — DATA REVIEWED
[FreeTextEntry1] : Patient: ASHLEE PEGUERO\par YOB: 1950\par Phone: [phone] \par MRN: [id number] Acc: [id number]\par Date of Exam: 09-\par  \par EXAM:  ULTRASOUND ABDOMEN COMPLETE\par \par HISTORY:  Male, 72 years-old with loss of appetite\par \par TECHNIQUE:  Using real-time ultrasonography with a high-resolution broadband phased-array curved transducer, multiplanar gray scale images were obtained and supplemented with color Doppler. Static images are provided for review.\par \par COMPARISON: Multiple, the most recent being a CT scan performed on 8/21/2015\par \par FINDINGS:  \par Abdominal Aorta/IVC: No evidence of abdominal aortic aneurysm. Visualized portions of the IVC were patent.\par \par Liver:  Normal in size, morphology, contour and echotexture. The visualized portion of portal and hepatic veins are unremarkable. No intrahepatic biliary duct dilatation. The liver has normal echogenicity.\par \par Gallbladder: Multiple probable polyps are noted with the largest one measuring 1 x 0.4 x 0.6 cm. The walls do not appear thickened.\par \par Common Bile Duct: Normal measuring 0.5 cm diameter at the jennifer hepatis.\par \par Pancreas: Obscured by overlying bowel\par \par Kidneys: Normal in size, morphology and cortical echotexture. There is no suspicious renal lesion.\par No hydronephrosis, shadowing calculi or perinephric fluid.\par Right Kidney: 10.7 cm in length. A 0.6 x 0.5 x 0.7 cm cyst is noted in the upper pole.\par Left Kidney:   11.1 cm in length. A 1.5 x 1.6 x 1.7 cm cyst is noted in the midportion. A 2.5 x 2.1 x 2.7 cm cyst is noted in the lower pole.\par \par Spleen: Normal size, contour and echotexture measuring 8.2 cm in length. \par \par IMPRESSION:  \par Multiple probable gallbladder polyps as noted above. In view of the size of the largest probable polyp and the multiplicity, further evaluation with an MRI of the gallbladder is recommended. Bilateral renal cysts as noted above.\par \par \par \par Patient: ASHLEE PEGUERO\par YOB: 1950\par Phone: [phone] \par MRN: [id number] Acc: [id number]\par Date of Exam: 01-\par  \par EXAM:  MRI ABDOMEN WITHOUT AND WITH CONTRAST\par \par HISTORY:  Follow-up to prior sonogram.\par \par TECHNIQUE:  MRI of the abdomen performed.\par IV Contrast:  15 ml of Clariscan was injected from a 15 ml single use vial.\par Oral Contrast: None.\par Acquisition:  A multiplanar multiecho liver MRI examination was performed utilizing a 3T magnet. \par Subtraction views are created. Diffusion weighted imaging with multiple B values and ADC map imaging is also provided. MRCP imaging is obtained with both thick slab and thin slab acquisition technique.\par \par COMPARISON:  Sonogram of abdomen 9/9/2022.\par \par FINDINGS: VISUALIZED PORTION OF CHEST\par LUNGS: No abnormal signal.\par HEART: No abnormal signal.\par \par FINDINGS: ABDOMEN\par \par LIVER: The liver is normal in size and morphology. Normal contour. No evidence for suspicious mass lesion. No steatosis.\par \par BILIARY: There is a 1.0 x 0.3 cm elongated enhancing polyp in the gallbladder. Several other small polyps are seen measuring to 3 mm. Normal caliber common bile duct.\par \par PANCREAS: Normal configuration with no mass or pancreatic duct dilatation.\par \par SPLEEN: Unremarkable.\par \par ADRENAL GLANDS: There is 1.7 cm left adrenal adenoma grossly unchanged compared to prior CT scan of 8/21/2015. No follow-up imaging is needed.\par The right adrenal gland appears normal.\par \par KIDNEYS: The kidneys are normal in size, shape, and position. There is no hydronephrosis. The kidneys demonstrate symmetric normal enhancement without evidence of enhancing mass lesion.\par \par There are two subcentimeter benign-appearing cysts in the right kidney. There are several benign-appearing cysts in left kidney measuring 2.7 cm. No follow-up imaging is needed.\par \par PROXIMAL URETERS: Unremarkable.\par \par STOMACH: Unremarkable.\par \par VISUALIZED PORTION OF BOWEL: Unremarkable.\par \par PERITONEUM: Unremarkable.\par \par LYMPH NODES: Unremarkable.\par \par VASCULATURE: Unremarkable.\par \par SOFT TISSUES: There are signal artifacts overlying the upper anterior abdominal wall most likely related to prior surgery.\par \par BONES: Unremarkable.\par \par IMPRESSION:\par 1. Elongated gallbladder polyp measuring 1.0 x 0.3 cm as well as several other small polyps measuring up to 3 mm.\par 2. Bilateral renal cysts.\par 3. Left adrenal adenoma measuring 1.7 cm.

## 2023-04-10 NOTE — CONSULT LETTER
[Dear  ___] : Dear  [unfilled], [Consult Letter:] : I had the pleasure of evaluating your patient, [unfilled]. [Consult Closing:] : Thank you very much for allowing me to participate in the care of this patient.  If you have any questions, please do not hesitate to contact me. [Sincerely,] : Sincerely, [FreeTextEntry3] : Stone Brice MD\par

## 2023-04-10 NOTE — PLAN
[FreeTextEntry1] : patient plans to go for stress testing in 2 weeks \par given size of gallbladder polyp, will plan for surgery, in approx 2 months \par \par Mr. ASHLEE PEGUERO was informed of significance of findings. All the options, risks and benefits were discussed at length, including the potential to go open, small potential for bleeding, CBD injury, bile leak, and other related complications. Informed consent for laparoscopic/possible open cholecystectomy and potential risks, benefits and alternatives (surgical options were discussed including non-surgical options or the option of no surgery) to the planned surgery were discussed in depth. \par All surgical options were discussed including non-surgical treatments. Patient wishes to proceed with surgery. We will plan for surgery at [facility], at the next available date, pending any required insurance pre-certification or pre-approval. Patient agrees to obtain any necessary pre-operative evaluations and testing prior to surgery.\par Patient advised to seek immediate medical attention with any acute change in symptoms or with the development of any new or worsening symptoms. Patient's questions and concerns addressed to patient's satisfaction, and patient verbalized an understanding of the information discussed.\par

## 2023-06-05 ENCOUNTER — OUTPATIENT (OUTPATIENT)
Dept: OUTPATIENT SERVICES | Facility: HOSPITAL | Age: 73
LOS: 1 days | End: 2023-06-05

## 2023-06-05 ENCOUNTER — APPOINTMENT (OUTPATIENT)
Dept: CV DIAGNOSTICS | Facility: HOSPITAL | Age: 73
End: 2023-06-05
Payer: MEDICARE

## 2023-06-05 DIAGNOSIS — I67.89 OTHER CEREBROVASCULAR DISEASE: ICD-10-CM

## 2023-06-05 PROCEDURE — 93018 CV STRESS TEST I&R ONLY: CPT | Mod: GC

## 2023-06-05 PROCEDURE — 78452 HT MUSCLE IMAGE SPECT MULT: CPT | Mod: 26,MH

## 2023-06-05 PROCEDURE — 93016 CV STRESS TEST SUPVJ ONLY: CPT | Mod: GC

## 2023-06-13 ENCOUNTER — APPOINTMENT (OUTPATIENT)
Dept: SURGERY | Facility: HOSPITAL | Age: 73
End: 2023-06-13

## 2023-06-19 ENCOUNTER — NON-APPOINTMENT (OUTPATIENT)
Age: 73
End: 2023-06-19

## 2023-06-26 ENCOUNTER — OUTPATIENT (OUTPATIENT)
Dept: OUTPATIENT SERVICES | Facility: HOSPITAL | Age: 73
LOS: 1 days | Discharge: HOME | End: 2023-06-26
Payer: MEDICARE

## 2023-06-26 DIAGNOSIS — I63.9 CEREBRAL INFARCTION, UNSPECIFIED: ICD-10-CM

## 2023-06-26 PROCEDURE — 33286 RMVL SUBQ CAR RHYTHM MNTR: CPT | Mod: 59

## 2023-06-26 PROCEDURE — 33285 INSJ SUBQ CAR RHYTHM MNTR: CPT

## 2023-06-26 RX ORDER — METOPROLOL TARTRATE 50 MG
0 TABLET ORAL
Qty: 180 | Refills: 0 | DISCHARGE

## 2023-06-26 RX ORDER — FINASTERIDE 5 MG/1
0 TABLET, FILM COATED ORAL
Qty: 90 | Refills: 0 | DISCHARGE

## 2023-06-26 RX ORDER — PANTOPRAZOLE SODIUM 20 MG/1
0 TABLET, DELAYED RELEASE ORAL
Qty: 90 | Refills: 0 | DISCHARGE

## 2023-06-26 RX ORDER — METFORMIN HYDROCHLORIDE 850 MG/1
0 TABLET ORAL
Qty: 90 | Refills: 0 | DISCHARGE

## 2023-06-26 RX ORDER — TAMSULOSIN HYDROCHLORIDE 0.4 MG/1
0 CAPSULE ORAL
Qty: 90 | Refills: 0 | DISCHARGE

## 2023-06-26 NOTE — H&P CARDIOLOGY - REVIEW OF SYSTEMS
Patient denies chest pain, SOB, palpitations, dizziness, presyncope, syncope,  headache, visual disturbances, PE, DVT, abdominal pain, N/V/D/C, hematochezia, melena, dysuria, hematuria, fever, chills.

## 2023-06-26 NOTE — H&P CARDIOLOGY - HISTORY OF PRESENT ILLNESS
72 y o male with a PMH of HTN, HLD, CAD, CABG, CVA in 2019, s/p ILR implant at that time   presented today for ILR explant and ILR reimplant to monitor embolic event caused by possible A Fib.    The process of the procedures along with the risk and benefits for the procedure were explained in detail which included but not limited to bleeding, infection. Patient expressed understanding an all questions were answered.   Patient denies chest pain, SOB, palpitations, dizziness, presyncope, syncope,  headache, visual disturbances, PE, DVT, abdominal pain, N/V/D/C, hematochezia, melena, dysuria, hematuria, fever, chills.

## 2023-06-26 NOTE — H&P CARDIOLOGY - NSICDXPASTMEDICALHX_GEN_ALL_CORE_FT
PAST MEDICAL HISTORY:  CAD (coronary artery disease)     CVA (cerebrovascular accident)     Diabetes     Hypercholesterolemia     Hypertension     Neoplasm abdominal    Syncope and collapse 6/2015    TIA (transient ischemic attack) March 2015- was admitted at [facility]

## 2023-06-26 NOTE — H&P CARDIOLOGY - NSICDXFAMILYHX_GEN_ALL_CORE_FT
Date/Time Patient Seen:  		  Referring MD:   Data Reviewed	       Patient is a 66y old  Female who presents with a chief complaint of Bronchitis (16 Jun 2019 15:28)      Subjective/HPI    in bed  seen and examined  vs and meds reviewed  sees Dr. Jalyn Genao    PMH reviewed  H and P reviewed  ER provider note reviewed    labs reviewed    History of Present Illness:  Reason for Admission: Bronchitis  History of Present Illness:   66 F with PMH of ALS, HTN, PEG tube, prior brain surgery who presents to the ED with worsening cough and SOB. Sx started 9 days ago and have not improved. Patient went to [place] where she had a negative CXR and was started on nebs and steroids in addition to levaquin which she had been taking for 8 days. Since that time patient has been having trouble clearing secretions, advised by her neurologist Dr. Membreno to come to ED. Denies fever, chills, weakness, runny nose.     In ED, VS stable. Labs significant for H/H 10.6/31. Lactate 1.4. ABG normal. CXR unremarkable. Patient received Duoneb and solumedrol in ED.        PAST MEDICAL & SURGICAL HISTORY:  HTN (hypertension)  ALS (amyotrophic lateral sclerosis)  No significant past surgical history        Medication list         MEDICATIONS  (STANDING):  amLODIPine   Tablet 5 milliGRAM(s) Oral daily  enoxaparin Injectable 40 milliGRAM(s) SubCutaneous every 24 hours  escitalopram 20 milliGRAM(s) Oral daily  glycopyrrolate 1 milliGRAM(s) Oral three times a day  guaiFENesin  milliGRAM(s) Oral every 12 hours  levoFLOXacin IVPB 750 milliGRAM(s) IV Intermittent every 24 hours  methylPREDNISolone sodium succinate Injectable 40 milliGRAM(s) IV Push daily  sodium chloride 0.65% Nasal 1 Spray(s) Both Nostrils two times a day    MEDICATIONS  (PRN):  ALBUTerol   0.5% 2.5 milliGRAM(s) Nebulizer every 6 hours PRN Shortness of Breath and/or Wheezing  guaiFENesin   Syrup  (Sugar-Free) 100 milliGRAM(s) Oral every 4 hours PRN Cough  lactulose Syrup 15 Gram(s) Oral daily PRN constipation         Vitals log        ICU Vital Signs Last 24 Hrs  T(C): 36.8 (17 Jun 2019 04:59), Max: 36.8 (17 Jun 2019 04:59)  T(F): 98.2 (17 Jun 2019 04:59), Max: 98.2 (17 Jun 2019 04:59)  HR: 88 (17 Jun 2019 04:59) (77 - 90)  BP: 138/81 (17 Jun 2019 04:59) (122/79 - 153/90)  BP(mean): --  ABP: --  ABP(mean): --  RR: 19 (17 Jun 2019 04:59) (18 - 21)  SpO2: 99% (17 Jun 2019 04:59) (95% - 99%)           Input and Output:  I&O's Detail      Lab Data                        11.7   9.89  )-----------( 272      ( 17 Jun 2019 06:14 )             31.6     06-17    138  |  100  |  32<H>  ----------------------------<  107<H>  3.3<L>   |  32<H>  |  0.77    Ca    9.0      17 Jun 2019 06:14      Patient History:    Past Medical, Past Surgical, and Family History:  PAST MEDICAL HISTORY:  ALS (amyotrophic lateral sclerosis)     HTN (hypertension).     No significant past surgical history.     No pertinent family history in first degree relatives.     No Pertinent Family History in first degree relatives of: ALS.     Social History:  Social History (marital status, living situation, occupation, tobacco use, alcohol and drug use, and sexual history): Lives with: alone  	Ambulates with cane    	Tobacco Use: Denies  	Alcohol Use: Denies  Substance Use: Denies     Tobacco Screening:  · Core Measure Site	Yes  · Has the patient used tobacco in the past 30 days?	No    Risk Assessment:    Present on Admission:  Deep Venous Thrombosis	no  Pulmonary Embolus	no          Review of Systems	  alert  weak      Objective     Physical Examination    weak  head at  heart s1s2  lung dec BS  abd soft      Pertinent Lab findings & Imaging      Miles:  NO   Adequate UO     I&O's Detail           Discussed with:     Cultures:	        Radiology            EXAM:  XR CHEST AP OR PA 1V                            PROCEDURE DATE:  06/14/2019          INTERPRETATION:  AP semierect chest on June 14, 2019 at 7:43 PM. Patient   has cough.    COMPARISON: None available.    Heart is magnified by technique.    The lung fields and pleural surfaces are unremarkable.    There is a reversed left shoulder replacement.    IMPRESSION: Reversed left shoulder replacement.                ROCIO SANCHEZ M.D., ATTENDING RADIOLOGIST  This document has been electronically signed. Jun 14 2019  8:26PM FAMILY HISTORY:  Sibling  Still living? No  Family history of heart failure, Age at diagnosis: Age Unknown    Aunt  Still living? No  Family history of heart disease, Age at diagnosis: Age Unknown

## 2023-06-26 NOTE — CHART NOTE - NSCHARTNOTEFT_GEN_A_CORE
Plan:   ILR explant and reimplantation  Post-op ILR instruction has been verbally explained and given to the patient. Patient was given ID card, Booklet and home monitor. Patient expressed understanding and all questions were answered. A carbon copy is located in the patient chart  Patient is schedule for an appointment on 7/12/23 @11:20am  You can return to normal activities 24hours after the procedure   No scrubbing the incision site   No lotion, ointment, powder or direct sunlight to the incision site for 2 weeks   No swimming pool, Jacuzzi, or bath for 7 days.   Patient can shower 24 hours after procedure. Pat the area dry  Pt was instructed to call [phone] if the following occurs:      - fever with temperature > 101F      - swelling, drainage or bleeding at the site incision

## 2023-07-10 ENCOUNTER — APPOINTMENT (OUTPATIENT)
Dept: SURGICAL ONCOLOGY | Facility: CLINIC | Age: 73
End: 2023-07-10
Payer: MEDICARE

## 2023-07-10 VITALS
HEART RATE: 81 BPM | OXYGEN SATURATION: 98 % | WEIGHT: 140 LBS | DIASTOLIC BLOOD PRESSURE: 76 MMHG | HEIGHT: 64 IN | RESPIRATION RATE: 16 BRPM | SYSTOLIC BLOOD PRESSURE: 122 MMHG | BODY MASS INDEX: 23.9 KG/M2

## 2023-07-10 DIAGNOSIS — K82.4 CHOLESTEROLOSIS OF GALLBLADDER: ICD-10-CM

## 2023-07-10 DIAGNOSIS — C49.A0 GASTROINTESTINAL STOMACL TUMOR,UNSPECIFIED SITE: ICD-10-CM

## 2023-07-10 PROCEDURE — 99204 OFFICE O/P NEW MOD 45 MIN: CPT

## 2023-07-10 NOTE — CONSULT LETTER
[Dear  ___] : Dear  [unfilled], [Please see my note below.] : Please see my note below. [Consult Closing:] : Thank you very much for allowing me to participate in the care of this patient.  If you have any questions, please do not hesitate to contact me. [Sincerely,] : Sincerely, [FreeTextEntry2] : Stone Brice MD  [FreeTextEntry3] : Kerwin Miguel MD\par Surgical Oncology\par [facility]\par Office: [phone]\par Cell: [phone]\par  [DrYenny  ___] : Dr. JORDAN [___] : [unfilled]

## 2023-07-10 NOTE — HISTORY OF PRESENT ILLNESS
[de-identified] : Mr. 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 Pathology is T2 N0, negative margins. He was under the care of Dr. Agustin Galvan of medical oncology for surveillance only as no adjuvant therapies were recommended.   He had upper and lower endoscopy in May 2016 with Dr. Irwin.  He had several polyps removed which were without worrisome findings. A gastric biopsy was also benign.  CT abd/pelvis completed March 15, 207 did not demonstrate any recurrent disease. There is a stable 1.4 cm left adrenal mass.  \par \par \par US abdomen 9/9/22 revealed multiple probable gallbladder polyps. \par Mostly recent MRI of abdomen on 1/17/23 demonstrated elongated gallbladder polyps measuring 1 x 0.3 cm as well as several other small polyps measuring up to 3 mm. Bilateral renal cysts. Left adrenal adenoma measuring 1.7 cm. \par \par Anserd present today 7/10/23- Denies abdominal pain, bloating, nausea/vomiting, bowel habit changes, hematochezia, black sticky stools, fever, chills, night sweats or weight loss. Patient had complaint of of loss of appetite in September 2022 and was saw Dr. Brice who sent patient for US of abd and subsequently a abdominal MRI in January 2023 and was found to have multiple probable gallbladder polyps \par \par He had seen Dr. Wells regarding urinary retention in the postop period, however, this has since resolved. He also previously reported intermittent belching and flatulence, which has also resolved at this point.  He is tolerating diet well without nausea or vomiting, moving his bowels without difficulty, and his weight remains stable.  He is scheduled for a routine echo and stress test with is cardiology next week.\par \par PMH: CAD, HTN, BPH, DM II, CABG x3, GIST, stroke 2019.

## 2023-07-11 PROBLEM — I63.9 CEREBRAL INFARCTION, UNSPECIFIED: Chronic | Status: ACTIVE | Noted: 2023-06-26

## 2023-07-12 ENCOUNTER — APPOINTMENT (OUTPATIENT)
Dept: ELECTROPHYSIOLOGY | Facility: CLINIC | Age: 73
End: 2023-07-12
Payer: MEDICARE

## 2023-07-12 DIAGNOSIS — I63.9 CEREBRAL INFARCTION, UNSPECIFIED: ICD-10-CM

## 2023-07-12 PROCEDURE — 93285 PRGRMG DEV EVAL SCRMS IP: CPT

## 2023-07-12 RX ORDER — ROSUVASTATIN CALCIUM 20 MG/1
20 TABLET, FILM COATED ORAL
Qty: 90 | Refills: 0 | Status: DISCONTINUED | COMMUNITY
Start: 2023-04-01 | End: 2023-07-12

## 2023-07-12 RX ORDER — ATORVASTATIN CALCIUM 20 MG/1
20 TABLET, FILM COATED ORAL
Refills: 0 | Status: ACTIVE | COMMUNITY

## 2023-07-12 RX ORDER — ATORVASTATIN CALCIUM 80 MG/1
80 TABLET, FILM COATED ORAL
Qty: 30 | Refills: 2 | Status: DISCONTINUED | COMMUNITY
End: 2023-07-12

## 2023-08-07 ENCOUNTER — OUTPATIENT (OUTPATIENT)
Dept: OUTPATIENT SERVICES | Facility: HOSPITAL | Age: 73
LOS: 1 days | End: 2023-08-07
Payer: COMMERCIAL

## 2023-08-07 VITALS
OXYGEN SATURATION: 98 % | DIASTOLIC BLOOD PRESSURE: 83 MMHG | HEIGHT: 64 IN | SYSTOLIC BLOOD PRESSURE: 138 MMHG | TEMPERATURE: 98 F | WEIGHT: 139.99 LBS | HEART RATE: 77 BPM | RESPIRATION RATE: 16 BRPM

## 2023-08-07 DIAGNOSIS — I10 ESSENTIAL (PRIMARY) HYPERTENSION: ICD-10-CM

## 2023-08-07 DIAGNOSIS — K82.4 CHOLESTEROLOSIS OF GALLBLADDER: ICD-10-CM

## 2023-08-07 DIAGNOSIS — E11.9 TYPE 2 DIABETES MELLITUS WITHOUT COMPLICATIONS: ICD-10-CM

## 2023-08-07 DIAGNOSIS — I25.10 ATHEROSCLEROTIC HEART DISEASE OF NATIVE CORONARY ARTERY WITHOUT ANGINA PECTORIS: ICD-10-CM

## 2023-08-07 DIAGNOSIS — Z01.818 ENCOUNTER FOR OTHER PREPROCEDURAL EXAMINATION: ICD-10-CM

## 2023-08-07 LAB — BLD GP AB SCN SERPL QL: SIGNIFICANT CHANGE UP

## 2023-08-07 NOTE — H&P PST ADULT - BIRTH SEX
Triage complaint:  Chief Complaint   Patient presents with   • Chest Pain Adult        Chief Complaint:   This is a 51 year old female patient presenting for chest pain    HPI:  51-year-old female presents complaining of chest pain. Patient states that she exercises especially cardiac exercise regularly. Has no difficulty with exercise. Has been having some left leg pain recently without swelling. She states yesterday she noticed some tightness to the right side of her chest by her sternum. Today it went to the sternum and then on the left side of the costochondral space. She does not feel shortness of breath. No nausea or vomiting. No diaphoresis. She does have an IUD in. She does not smoke. No personal or family history of ACS, DVT, PE, dissection, aneurysm. States that she has not had any recent travel. She wanted to make sure she does not have a blood clot.    Patient states that she is also concerned that this could be due to anxiety/panic attack.    Review of Systems   Constitutional: Negative.    HENT: Negative.    Eyes: Negative.    Respiratory: Positive for chest tightness. Negative for cough and shortness of breath.    Cardiovascular: Negative for palpitations and leg swelling.   Gastrointestinal: Negative.    Endocrine: Negative.    Genitourinary: Negative.    Skin: Negative.    Allergic/Immunologic: Negative.    Neurological: Negative.    Hematological: Negative.    Psychiatric/Behavioral: The patient is nervous/anxious.    All other systems reviewed and are negative.       PAST MEDICAL HISTORY:  .  Patient Active Problem List   Diagnosis   • Tinnitus   • Oral lesion   • Onychomycosis   • Male pattern alopecia   • Low hemoglobin   • Intramural and submucous leiomyoma of uterus   • Helicobacter pylori infection   • FH: diabetes mellitus   • Atypical lobular hyperplasia (ALH) of breast   • ASCUS of cervix with negative high risk HPV   • Adhesive capsulitis of right shoulder   • Adhesive capsulitis of left  shoulder   • TIA (transient ischemic attack)          PAST SURGICAL HISTORY:  Past Surgical History:   Procedure Laterality Date   •  section, low transverse          PAST SOCIAL HISTORY   Social History     Socioeconomic History   • Marital status: /Civil Union     Spouse name: Not on file   • Number of children: Not on file   • Years of education: Not on file   • Highest education level: Not on file   Occupational History   • Not on file   Tobacco Use   • Smoking status: Never Smoker   • Smokeless tobacco: Never Used   Substance and Sexual Activity   • Alcohol use: Never   • Drug use: Never   • Sexual activity: Not on file   Other Topics Concern   • Not on file   Social History Narrative   • Not on file     Social Determinants of Health     Financial Resource Strain: Not on file   Food Insecurity: Not on file   Transportation Needs: Not on file   Physical Activity: Not on file   Stress: Not on file   Social Connections: Not on file   Intimate Partner Violence: Not on file        FAMILY HISTORY  No family history of coronary artery disease, pulmonary embolism, DVT, dissection, aneurysm    Allergies  ALLERGIES:  Amlodipine, Lisinopril, Penicillins, and Pravastatin    Home Medications  No current facility-administered medications for this encounter.     No current outpatient medications on file.          Physical Exam  ED Triage Vitals [22 1207]   Enc Vitals Group      BP (!) 169/102      Heart Rate 75      Resp 15      Temp 98.4 °F (36.9 °C)      Temp src Oral      SpO2 100 %      Weight 130 lb 15.3 oz (59.4 kg)      Height       Head Circumference       Peak Flow       Pain Score       Pain Loc       Pain Edu?       Excl. in GC?             Physical Exam  Vitals and nursing note reviewed.   Constitutional:       General: She is not in acute distress.     Appearance: She is well-developed and normal weight. She is not ill-appearing, toxic-appearing or diaphoretic.   HENT:      Head:  Normocephalic and atraumatic.      Neck: Normal range of motion and neck supple.   Eyes:      Extraocular Movements: Extraocular movements intact.      Pupils: Pupils are equal, round, and reactive to light.   Cardiovascular:      Rate and Rhythm: Normal rate and regular rhythm.      Pulses:           Carotid pulses are 2+ on the right side and 2+ on the left side.       Radial pulses are 2+ on the right side and 2+ on the left side.        Dorsalis pedis pulses are 2+ on the right side and 2+ on the left side.        Posterior tibial pulses are 2+ on the right side and 2+ on the left side.      Heart sounds: Normal heart sounds.   Pulmonary:      Effort: Pulmonary effort is normal.   Chest:      Chest wall: No mass, deformity, tenderness, crepitus or edema. There is no dullness to percussion.   Abdominal:      General: Bowel sounds are normal.      Palpations: Abdomen is soft.   Musculoskeletal:         General: Normal range of motion.   Skin:     General: Skin is warm and dry.      Capillary Refill: Capillary refill takes less than 2 seconds.   Neurological:      Mental Status: She is alert and oriented to person, place, and time.   Psychiatric:         Mood and Affect: Mood normal.         Behavior: Behavior normal.           ECG Read Date: 2/27/2022   ECG Interpretation: EKG performed at 1216 shows normal sinus rhythm. No previous EKG available comparison. EKG interpreted by myself the ED physician.    Cardiac monitor at 1331 shows normal sinus rhythm at 71 bpm.    Pulse ox at 1338 shows an SPO2 of 100% on room air.    Cardiac monitor at 1338 shows a normal sinus rhythm at 60 bpm.         Labwork:  Admission on 02/27/2022, Discharged on 02/27/2022   Component Date Value Ref Range Status   • Sodium 02/27/2022 139  135 - 145 mmol/L Final   • Potassium 02/27/2022 3.8  3.4 - 5.1 mmol/L Final   • Chloride 02/27/2022 106  98 - 107 mmol/L Final   • Carbon Dioxide 02/27/2022 27  21 - 32 mmol/L Final   • Anion Gap  02/27/2022 10  10 - 20 mmol/L Final   • Glucose 02/27/2022 96  70 - 99 mg/dL Final   • BUN 02/27/2022 19  6 - 20 mg/dL Final   • Creatinine 02/27/2022 0.67  0.51 - 0.95 mg/dL Final   • Glomerular Filtration Rate 02/27/2022 >90  >=60 Final    eGFR results = or >60 mL/min/1.73m2 = Normal kidney function. Estimated GFR calculated using the 2009 CKD-EPI creatinine equation.     • BUN/ Creatinine Ratio 02/27/2022 28 (A) 7 - 25 Final   • Calcium 02/27/2022 9.5  8.4 - 10.2 mg/dL Final   • Bilirubin, Total 02/27/2022 0.4  0.2 - 1.0 mg/dL Final   • GOT/AST 02/27/2022 17  <=37 Units/L Final   • GPT/ALT 02/27/2022 29  <64 Units/L Final   • Alkaline Phosphatase 02/27/2022 70  45 - 117 Units/L Final   • Albumin 02/27/2022 4.3  3.6 - 5.1 g/dL Final   • Protein, Total 02/27/2022 8.3 (A) 6.4 - 8.2 g/dL Final   • Globulin 02/27/2022 4.0  2.0 - 4.0 g/dL Final   • A/G Ratio 02/27/2022 1.1  1.0 - 2.4 Final   • Troponin I, High Sensitivity 02/27/2022 6  <52 ng/L Final   • Ventricular Rate EKG/Min (BPM) 02/27/2022 66   Final   • Atrial Rate (BPM) 02/27/2022 66   Final   • CA-Interval (MSEC) 02/27/2022 124   Final   • QRS-Interval (MSEC) 02/27/2022 84   Final   • QT-Interval (MSEC) 02/27/2022 382   Final   • QTc 02/27/2022 400   Final   • P Axis (Degrees) 02/27/2022 65   Final   • R Axis (Degrees) 02/27/2022 74   Final   • T Axis (Degrees) 02/27/2022 59   Final   • REPORT TEXT 02/27/2022    Final                    Value:Normal sinus rhythm  Minimal voltage criteria for LVH, may be normal variant  Borderline ECG  No previous ECGs available  Confirmed by ANYA ARRIETA CATHIE ([id number]) on 2/27/2022 3:48:52 PM     • WBC 02/27/2022 5.9  4.2 - 11.0 K/mcL Final   • RBC 02/27/2022 4.92  4.00 - 5.20 mil/mcL Final   • HGB 02/27/2022 11.3 (A) 12.0 - 15.5 g/dL Final   • HCT 02/27/2022 36.8  36.0 - 46.5 % Final   • MCV 02/27/2022 74.8 (A) 78.0 - 100.0 fl Final   • MCH 02/27/2022 23.0 (A) 26.0 - 34.0 pg Final   • MCHC 02/27/2022 30.7 (A) 32.0 - 36.5 g/dL  Final   • RDW-CV 02/27/2022 14.9  11.0 - 15.0 % Final   • RDW-SD 02/27/2022 39.4  39.0 - 50.0 fL Final   • PLT 02/27/2022 173  140 - 450 K/mcL Final   • NRBC 02/27/2022 0  <=0 /100 WBC Final   • Neutrophil, Percent 02/27/2022 44  % Final   • Lymphocytes, Percent 02/27/2022 46  % Final   • Mono, Percent 02/27/2022 7  % Final   • Eosinophils, Percent 02/27/2022 2  % Final   • Basophils, Percent 02/27/2022 1  % Final   • Immature Granulocytes 02/27/2022 0  % Final   • Absolute Neutrophils 02/27/2022 2.6  1.8 - 7.7 K/mcL Final   • Absolute Lymphocytes 02/27/2022 2.7  1.0 - 4.0 K/mcL Final   • Absolute Monocytes 02/27/2022 0.4  0.3 - 0.9 K/mcL Final   • Absolute Eosinophils  02/27/2022 0.1  0.0 - 0.5 K/mcL Final   • Absolute Basophils 02/27/2022 0.0  0.0 - 0.3 K/mcL Final   • Absolute Immmature Granulocytes 02/27/2022 0.0  0.0 - 0.2 K/mcL Final   • D Dimer, Quantitative 02/27/2022 <0.19  <0.57 mg/L (FEU) Final          Imaging:  XR CHEST PA AND LATERAL 2 VIEWS   Final Result      1.  Unremarkable radiographic appearance of the heart and lungs.                        Electronically Signed by: ALLEN SCANLON M.D.    Signed on: 2/27/2022 1:45 PM               .HEART Score  History: Slightly suspicious  EKG: Normal  Age: greater than 45 to less than 65  Risk Factors: No risk factors known  Troponin: Equal or less than normal limit  HEART Score Total : 1      MDM:  Patient placed in ED bed 35. No clinical suspicion for COVID. PPE used per protocol. IV established. Placed on continuous cardiac for monitoring. Vital signs normal. Physical exam benign. Blood work including troponin and dimer negative. Chest x-ray and EKG nonacute. Patient has had continuous symptoms since last night. Symptoms likely either musculoskeletal or anxiety in nature. Patient is educated and reliable. She exercises regularly. Discharge home with chest pain plan. PCP follow-up. Verbalized understanding treatment plan. Given copies of  results.      Re-examination     .  Vitals:    02/27/22 1338   BP: 128/86   Pulse: 60   Resp: 16   Temp:          Final Diagnosis  ED Diagnosis        Final diagnosis    Chest pain with low risk of acute coronary syndrome                    Rizwana Saldana,   02/27/22 1631       Rizwana Saldana,   02/27/22 1631     Male

## 2023-08-07 NOTE — H&P PST ADULT - PROBLEM SELECTOR PLAN 3
Pt instructed to continue all antihypertensives as prescribed including taking on day of surgery with small sip of water.

## 2023-08-07 NOTE — H&P PST ADULT - HISTORY OF PRESENT ILLNESS
DM, HLD, HTN, CAD S/P CABG x 3 2008, gastrointestinal stromal tumor s/p resection 2015, CVA 2019 right sided weakness. Loop recorder Implantation 6/2023   c/o poor appetite,  on w/u found to have Gallbladder polyp, and   now presents for presurgical evaluation for schedule Robotic Assisted Laparoscopic Cholecystectomy on 8/15/23 with Dr Brice       73 y.o male with PMHx for DM, HLD, HTN, CAD S/P CABG x 3 2008, gastrointestinal stromal tumor s/p resection 2015, CVA 2019 right sided weakness.   Loop recorder Explant and Reimplantation 6/2023. Now with c/o poor appetite,  on w/u found to have Gallbladder polyp.  Presents for presurgical evaluation for schedule Robotic Assisted Laparoscopic Cholecystectomy on 8/15/23 with Dr Brice

## 2023-08-07 NOTE — H&P PST ADULT - PROBLEM SELECTOR PLAN 1
Pt schedule for Robotic Assisted Laparoscopic Cholecystectomy on 8/15/23 with Dr Yuniel Metcalf drawn in PCP   Pt Medical clearance    Pt was  instructed to stop aspirin/ecotrin and all over the counter medication including vitamins and herbal supplements one week prior to surgery   Instructions given on the use of 4% chlorhexidine wash and Pt verbalized understanding of same   Pt Instructed to have nothing by mouth starting midnight day before surgery  Patient is to expect a phone call day before surgery between the hours of 430- 630pm giving arrival time for surgery   Written and verbal preoperative instructions given to patient with understanding verbalized.     Patient today with STOP bang score  Low  risk for MARIAH Pt schedule for Robotic Assisted Laparoscopic Cholecystectomy on 8/15/23 with Dr Brice    Labs and EKG done by PCP 7/27/23- will f/u result  Pt was seen by PCP for Medical clearance 7/27/23- will f/u report  Pt was also seen by Cardiologist for cardiac clearance- will f/u report     Pt was  instructed to stop aspirin/ecotrin and all over the counter medication including vitamins and herbal supplements one week prior to surgery   Instructions given on the use of 4% chlorhexidine wash and Pt verbalized understanding of same   Pt Instructed to have nothing by mouth starting midnight day before surgery  Patient is to expect a phone call day before surgery between the hours of 430- 630pm giving arrival time for surgery   Written and verbal preoperative instructions given to patient with understanding verbalized.     Patient today with STOP bang score 4  Low  risk for MARIAH

## 2023-08-07 NOTE — H&P PST ADULT - MUSCULOSKELETAL
details… ROM intact/normal gait/strength 5/5 bilateral upper extremities/strength 5/5 bilateral lower extremities right sided weakness/ROM intact/abnormal gait

## 2023-08-07 NOTE — H&P PST ADULT - ASSESSMENT
Robotic Assisted Laparoscopic Cholecystectomy on 8/15/23 with Dr Brice 73 y.o male with Gallbladder polyp now for schedule Robotic Assisted Laparoscopic Cholecystectomy on 8/15/23 with Dr Yuniel neville score 4

## 2023-08-07 NOTE — H&P PST ADULT - PROBLEM SELECTOR PLAN 2
Hx of  CABG x 3 2008 on aspirin 81 mg daily- Per pt he was told by PCP and cardiologist to stop aspirin one week prior to surgery.  Loop recorder Explant and Reimplantation 6/2023.

## 2023-08-07 NOTE — H&P PST ADULT - NSICDXPASTMEDICALHX_GEN_ALL_CORE_FT
PAST MEDICAL HISTORY:  CAD (coronary artery disease)     CVA (cerebrovascular accident)     Diabetes     Hypercholesterolemia     Hypertension     Neoplasm abdominal    Syncope and collapse 6/2015    TIA (transient ischemic attack) March 2015- was admitted at [facility]

## 2023-08-08 PROCEDURE — G0463: CPT

## 2023-08-14 ENCOUNTER — TRANSCRIPTION ENCOUNTER (OUTPATIENT)
Age: 73
End: 2023-08-14

## 2023-08-15 ENCOUNTER — TRANSCRIPTION ENCOUNTER (OUTPATIENT)
Age: 73
End: 2023-08-15

## 2023-08-15 ENCOUNTER — OUTPATIENT (OUTPATIENT)
Dept: OUTPATIENT SERVICES | Facility: HOSPITAL | Age: 73
LOS: 1 days | End: 2023-08-15
Payer: COMMERCIAL

## 2023-08-15 ENCOUNTER — RESULT REVIEW (OUTPATIENT)
Age: 73
End: 2023-08-15

## 2023-08-15 ENCOUNTER — APPOINTMENT (OUTPATIENT)
Dept: SURGERY | Facility: HOSPITAL | Age: 73
End: 2023-08-15

## 2023-08-15 VITALS
TEMPERATURE: 98 F | SYSTOLIC BLOOD PRESSURE: 121 MMHG | WEIGHT: 139.99 LBS | OXYGEN SATURATION: 100 % | HEIGHT: 64 IN | HEART RATE: 69 BPM | RESPIRATION RATE: 16 BRPM | DIASTOLIC BLOOD PRESSURE: 74 MMHG

## 2023-08-15 VITALS
TEMPERATURE: 98 F | DIASTOLIC BLOOD PRESSURE: 68 MMHG | SYSTOLIC BLOOD PRESSURE: 112 MMHG | OXYGEN SATURATION: 96 % | RESPIRATION RATE: 16 BRPM | HEART RATE: 64 BPM

## 2023-08-15 DIAGNOSIS — K82.4 CHOLESTEROLOSIS OF GALLBLADDER: ICD-10-CM

## 2023-08-15 LAB
BLD GP AB SCN SERPL QL: SIGNIFICANT CHANGE UP
GLUCOSE BLDC GLUCOMTR-MCNC: 109 MG/DL — HIGH (ref 70–99)
GLUCOSE BLDC GLUCOMTR-MCNC: 150 MG/DL — HIGH (ref 70–99)

## 2023-08-15 PROCEDURE — 82962 GLUCOSE BLOOD TEST: CPT

## 2023-08-15 PROCEDURE — 88304 TISSUE EXAM BY PATHOLOGIST: CPT

## 2023-08-15 PROCEDURE — 86850 RBC ANTIBODY SCREEN: CPT

## 2023-08-15 PROCEDURE — 86900 BLOOD TYPING SEROLOGIC ABO: CPT

## 2023-08-15 PROCEDURE — S2900 ROBOTIC SURGICAL SYSTEM: CPT | Mod: NC

## 2023-08-15 PROCEDURE — 88304 TISSUE EXAM BY PATHOLOGIST: CPT | Mod: 26

## 2023-08-15 PROCEDURE — C1889: CPT

## 2023-08-15 PROCEDURE — S2900: CPT

## 2023-08-15 PROCEDURE — 36415 COLL VENOUS BLD VENIPUNCTURE: CPT

## 2023-08-15 PROCEDURE — 47562 LAPAROSCOPIC CHOLECYSTECTOMY: CPT

## 2023-08-15 PROCEDURE — 47562 LAPAROSCOPIC CHOLECYSTECTOMY: CPT | Mod: AS

## 2023-08-15 PROCEDURE — 86901 BLOOD TYPING SEROLOGIC RH(D): CPT

## 2023-08-15 DEVICE — LIGATING CLIPS WECK HEMOLOK POLYMER LARGE (PURPLE) 6: Type: IMPLANTABLE DEVICE | Status: FUNCTIONAL

## 2023-08-15 RX ORDER — METOPROLOL TARTRATE 50 MG
1 TABLET ORAL
Refills: 0 | DISCHARGE

## 2023-08-15 RX ORDER — ASPIRIN/CALCIUM CARB/MAGNESIUM 324 MG
1 TABLET ORAL
Qty: 0 | Refills: 0 | DISCHARGE

## 2023-08-15 RX ORDER — TAMSULOSIN HYDROCHLORIDE 0.4 MG/1
1 CAPSULE ORAL
Refills: 0 | DISCHARGE

## 2023-08-15 RX ORDER — FENTANYL CITRATE 50 UG/ML
25 INJECTION INTRAVENOUS
Refills: 0 | Status: DISCONTINUED | OUTPATIENT
Start: 2023-08-15 | End: 2023-08-15

## 2023-08-15 RX ORDER — ROSUVASTATIN CALCIUM 5 MG/1
1 TABLET ORAL
Refills: 0 | DISCHARGE

## 2023-08-15 RX ORDER — PANTOPRAZOLE SODIUM 20 MG/1
1 TABLET, DELAYED RELEASE ORAL
Refills: 0 | DISCHARGE

## 2023-08-15 RX ORDER — LOSARTAN POTASSIUM 100 MG/1
1 TABLET, FILM COATED ORAL
Qty: 30 | Refills: 0
Start: 2023-08-15 | End: 2023-09-13

## 2023-08-15 RX ORDER — FINASTERIDE 5 MG/1
1 TABLET, FILM COATED ORAL
Refills: 0 | DISCHARGE

## 2023-08-15 RX ORDER — LOSARTAN POTASSIUM 100 MG/1
1 TABLET, FILM COATED ORAL
Refills: 0 | DISCHARGE

## 2023-08-15 RX ORDER — INDOCYANINE GREEN 25 MG
5 KIT INTRAVASCULAR; INTRAVENOUS ONCE
Refills: 0 | Status: COMPLETED | OUTPATIENT
Start: 2023-08-15 | End: 2023-08-15

## 2023-08-15 RX ORDER — SODIUM CHLORIDE 9 MG/ML
3 INJECTION INTRAMUSCULAR; INTRAVENOUS; SUBCUTANEOUS EVERY 8 HOURS
Refills: 0 | Status: DISCONTINUED | OUTPATIENT
Start: 2023-08-15 | End: 2023-08-15

## 2023-08-15 RX ORDER — ONDANSETRON 8 MG/1
4 TABLET, FILM COATED ORAL ONCE
Refills: 0 | Status: DISCONTINUED | OUTPATIENT
Start: 2023-08-15 | End: 2023-08-15

## 2023-08-15 RX ORDER — AMLODIPINE BESYLATE 2.5 MG/1
1 TABLET ORAL
Refills: 0 | DISCHARGE

## 2023-08-15 RX ORDER — FENTANYL CITRATE 50 UG/ML
50 INJECTION INTRAVENOUS
Refills: 0 | Status: DISCONTINUED | OUTPATIENT
Start: 2023-08-15 | End: 2023-08-15

## 2023-08-15 RX ORDER — OXYCODONE HYDROCHLORIDE 5 MG/1
1 TABLET ORAL
Qty: 4 | Refills: 0
Start: 2023-08-15 | End: 2023-08-15

## 2023-08-15 RX ADMIN — INDOCYANINE GREEN 5 MILLIGRAM(S): KIT INTRAVASCULAR; INTRAVENOUS at 08:31

## 2023-08-15 NOTE — ASU DISCHARGE PLAN (ADULT/PEDIATRIC) - CARE PROVIDER_API CALL
Stone Brice.  Surgery  [address]  [place] [address]  Phone: [phone]  Fax: [phone]  Follow Up Time: 2 weeks

## 2023-08-15 NOTE — ASU PATIENT PROFILE, ADULT - NSICDXPASTMEDICALHX_GEN_ALL_CORE_FT
PAST MEDICAL HISTORY:  CAD (coronary artery disease)     CVA (cerebrovascular accident)     Diabetes     Hypercholesterolemia     Hypertension     Neoplasm abdominal    Syncope and collapse 6/2015    TIA (transient ischemic attack) March 2015- was admitted at [facility]

## 2023-08-15 NOTE — ASU DISCHARGE PLAN (ADULT/PEDIATRIC) - ACTIVITY LEVEL
No exercise/No heavy lifting No exercise/No heavy lifting/No sports/gym No heavy lifting or strenuous activity. Nothing heavier than 10 pounds until seen by Dr Brice/No exercise/No heavy lifting/No sports/gym

## 2023-08-15 NOTE — ASU PATIENT PROFILE, ADULT - FALL HARM RISK - UNIVERSAL INTERVENTIONS
Bed in lowest position, wheels locked, appropriate side rails in place/Call bell, personal items and telephone in reach/Instruct patient to call for assistance before getting out of bed or chair/Non-slip footwear when patient is out of bed/[place] to call system/Physically safe environment - no spills, clutter or unnecessary equipment/Purposeful Proactive Rounding/Room/bathroom lighting operational, light cord in reach

## 2023-08-17 ENCOUNTER — EMERGENCY (EMERGENCY)
Facility: HOSPITAL | Age: 73
LOS: 1 days | Discharge: HOME | End: 2023-08-17
Attending: EMERGENCY MEDICINE
Payer: COMMERCIAL

## 2023-08-17 VITALS
OXYGEN SATURATION: 96 % | SYSTOLIC BLOOD PRESSURE: 123 MMHG | HEIGHT: 64 IN | TEMPERATURE: 98 F | HEART RATE: 76 BPM | DIASTOLIC BLOOD PRESSURE: 79 MMHG | RESPIRATION RATE: 18 BRPM | WEIGHT: 139.99 LBS

## 2023-08-17 LAB
ALBUMIN SERPL ELPH-MCNC: 3.6 G/DL — SIGNIFICANT CHANGE UP (ref 3.5–5)
ALP SERPL-CCNC: 82 U/L — SIGNIFICANT CHANGE UP (ref 40–120)
ALT FLD-CCNC: 27 U/L DA — SIGNIFICANT CHANGE UP (ref 10–60)
ANION GAP SERPL CALC-SCNC: 7 MMOL/L — SIGNIFICANT CHANGE UP (ref 5–17)
APPEARANCE UR: CLEAR — SIGNIFICANT CHANGE UP
AST SERPL-CCNC: 23 U/L — SIGNIFICANT CHANGE UP (ref 10–40)
BASOPHILS # BLD AUTO: 0.05 K/UL — SIGNIFICANT CHANGE UP (ref 0–0.2)
BASOPHILS NFR BLD AUTO: 0.6 % — SIGNIFICANT CHANGE UP (ref 0–2)
BILIRUB SERPL-MCNC: 1.1 MG/DL — SIGNIFICANT CHANGE UP (ref 0.2–1.2)
BILIRUB UR-MCNC: NEGATIVE — SIGNIFICANT CHANGE UP
BUN SERPL-MCNC: 26 MG/DL — HIGH (ref 7–18)
CALCIUM SERPL-MCNC: 8.9 MG/DL — SIGNIFICANT CHANGE UP (ref 8.4–10.5)
CHLORIDE SERPL-SCNC: 106 MMOL/L — SIGNIFICANT CHANGE UP (ref 96–108)
CO2 SERPL-SCNC: 22 MMOL/L — SIGNIFICANT CHANGE UP (ref 22–31)
COLOR SPEC: YELLOW — SIGNIFICANT CHANGE UP
CREAT SERPL-MCNC: 1.09 MG/DL — SIGNIFICANT CHANGE UP (ref 0.5–1.3)
DIFF PNL FLD: NEGATIVE — SIGNIFICANT CHANGE UP
EGFR: 71 ML/MIN/1.73M2 — SIGNIFICANT CHANGE UP
EOSINOPHIL # BLD AUTO: 0.21 K/UL — SIGNIFICANT CHANGE UP (ref 0–0.5)
EOSINOPHIL NFR BLD AUTO: 2.6 % — SIGNIFICANT CHANGE UP (ref 0–6)
GLUCOSE SERPL-MCNC: 110 MG/DL — HIGH (ref 70–99)
GLUCOSE UR QL: NEGATIVE MG/DL — SIGNIFICANT CHANGE UP
HCT VFR BLD CALC: 43.9 % — SIGNIFICANT CHANGE UP (ref 39–50)
HGB BLD-MCNC: 14.3 G/DL — SIGNIFICANT CHANGE UP (ref 13–17)
IMM GRANULOCYTES NFR BLD AUTO: 0.1 % — SIGNIFICANT CHANGE UP (ref 0–0.9)
KETONES UR-MCNC: NEGATIVE MG/DL — SIGNIFICANT CHANGE UP
LEUKOCYTE ESTERASE UR-ACNC: NEGATIVE — SIGNIFICANT CHANGE UP
LYMPHOCYTES # BLD AUTO: 2.01 K/UL — SIGNIFICANT CHANGE UP (ref 1–3.3)
LYMPHOCYTES # BLD AUTO: 25.1 % — SIGNIFICANT CHANGE UP (ref 13–44)
MCHC RBC-ENTMCNC: 26.4 PG — LOW (ref 27–34)
MCHC RBC-ENTMCNC: 32.6 GM/DL — SIGNIFICANT CHANGE UP (ref 32–36)
MCV RBC AUTO: 81 FL — SIGNIFICANT CHANGE UP (ref 80–100)
MONOCYTES # BLD AUTO: 0.69 K/UL — SIGNIFICANT CHANGE UP (ref 0–0.9)
MONOCYTES NFR BLD AUTO: 8.6 % — SIGNIFICANT CHANGE UP (ref 2–14)
NEUTROPHILS # BLD AUTO: 5.04 K/UL — SIGNIFICANT CHANGE UP (ref 1.8–7.4)
NEUTROPHILS NFR BLD AUTO: 63 % — SIGNIFICANT CHANGE UP (ref 43–77)
NITRITE UR-MCNC: NEGATIVE — SIGNIFICANT CHANGE UP
NRBC # BLD: 0 /100 WBCS — SIGNIFICANT CHANGE UP (ref 0–0)
PH UR: 6.5 — SIGNIFICANT CHANGE UP (ref 5–8)
PLATELET # BLD AUTO: 234 K/UL — SIGNIFICANT CHANGE UP (ref 150–400)
POTASSIUM SERPL-MCNC: 3.9 MMOL/L — SIGNIFICANT CHANGE UP (ref 3.5–5.3)
POTASSIUM SERPL-SCNC: 3.9 MMOL/L — SIGNIFICANT CHANGE UP (ref 3.5–5.3)
PROT SERPL-MCNC: 7.6 G/DL — SIGNIFICANT CHANGE UP (ref 6–8.3)
PROT UR-MCNC: NEGATIVE MG/DL — SIGNIFICANT CHANGE UP
RBC # BLD: 5.42 M/UL — SIGNIFICANT CHANGE UP (ref 4.2–5.8)
RBC # FLD: 14.8 % — HIGH (ref 10.3–14.5)
SODIUM SERPL-SCNC: 135 MMOL/L — SIGNIFICANT CHANGE UP (ref 135–145)
SP GR SPEC: 1.02 — SIGNIFICANT CHANGE UP (ref 1–1.03)
UROBILINOGEN FLD QL: 0.2 MG/DL — SIGNIFICANT CHANGE UP (ref 0.2–1)
WBC # BLD: 8.01 K/UL — SIGNIFICANT CHANGE UP (ref 3.8–10.5)
WBC # FLD AUTO: 8.01 K/UL — SIGNIFICANT CHANGE UP (ref 3.8–10.5)

## 2023-08-17 PROCEDURE — 87086 URINE CULTURE/COLONY COUNT: CPT

## 2023-08-17 PROCEDURE — 85025 COMPLETE CBC W/AUTO DIFF WBC: CPT

## 2023-08-17 PROCEDURE — 99284 EMERGENCY DEPT VISIT MOD MDM: CPT | Mod: 25

## 2023-08-17 PROCEDURE — 99284 EMERGENCY DEPT VISIT MOD MDM: CPT

## 2023-08-17 PROCEDURE — 80053 COMPREHEN METABOLIC PANEL: CPT

## 2023-08-17 PROCEDURE — 36415 COLL VENOUS BLD VENIPUNCTURE: CPT

## 2023-08-17 PROCEDURE — 81003 URINALYSIS AUTO W/O SCOPE: CPT

## 2023-08-17 PROCEDURE — 99283 EMERGENCY DEPT VISIT LOW MDM: CPT | Mod: 25

## 2023-08-17 PROCEDURE — 51702 INSERT TEMP BLADDER CATH: CPT

## 2023-08-17 RX ORDER — SODIUM CHLORIDE 9 MG/ML
1000 INJECTION INTRAMUSCULAR; INTRAVENOUS; SUBCUTANEOUS
Refills: 0 | Status: DISCONTINUED | OUTPATIENT
Start: 2023-08-17 | End: 2023-08-21

## 2023-08-17 RX ADMIN — SODIUM CHLORIDE 150 MILLILITER(S): 9 INJECTION INTRAMUSCULAR; INTRAVENOUS; SUBCUTANEOUS at 15:03
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
## 2023-08-17 NOTE — ED PROVIDER NOTE - NSFOLLOWUPINSTRUCTIONS_ED_ALL_ED_FT
Acute Urinary Retention, Male    Acute urinary retention is a condition in which a person is unable to pass urine or can only pass a little urine. This condition can happen suddenly and last for a short time. If left untreated, it can become long-term (chronic) and result in kidney damage or other serious complications.    What are the causes?  This condition may be caused by:  Obstruction or narrowing of the tube that drains the bladder (urethra). This may be caused by surgery, problems with nearby organs, or injury to the bladder or urethra.  Problems with the nerves in the bladder.  Tumors in the area of the pelvis, bladder, or urethra.  Certain medicines.  Bladder or urinary tract infection.  Constipation.  What increases the risk?  This condition is more likely to develop in older men. As men age, their prostate may become larger and may start to press or squeeze on the bladder or the urethra. Other chronic health conditions can increase the risk of acute urinary retention. These include:  Diseases such as multiple sclerosis.  Spinal cord injuries.  Diabetes.  Degenerative cognitive conditions, such as delirium or dementia.  Psychological conditions. A man may hold his urine due to trauma or because he does not want to use the bathroom.  What are the signs or symptoms?  Symptoms of this condition include:  Trouble urinating.  Pain in the lower abdomen.  How is this diagnosed?  This condition is diagnosed based on a physical exam and your medical history. You may also have other tests, including:  An ultrasound of the bladder or kidneys or both.  Blood tests.  A urine analysis.  Additional tests may be needed, such as a CT scan, MRI, and kidney or bladder function tests.  How is this treated?  Treatment for this condition may include:  Medicines.  Placing a thin, sterile tube (catheter) into the bladder to drain urine out of the body. This is called an indwelling urinary catheter. After it is inserted, the catheter is held in place with a small balloon that is filled with sterile water. Urine drains from the catheter into a collection bag outside of the body.  Behavioral therapy.  Treatment for other conditions.  If needed, you may be treated in the hospital for kidney function problems or to manage other complications.    Follow these instructions at home:  Medicines    Take over-the-counter and prescription medicines only as told by your health care provider. Avoid certain medicines, such as decongestants, antihistamines, and some prescription medicines. Do not take any medicine unless your health care provider approves.  If you were prescribed an antibiotic medicine, take it as told by your health care provider. Do not stop using the antibiotic even if you start to feel better.  General instructions    Do not use any products that contain nicotine or tobacco. These products include cigarettes, chewing tobacco, and vaping devices, such as e-cigarettes. If you need help quitting, ask your health care provider.  Drink enough fluid to keep your urine pale yellow.  If you have an indwelling urinary catheter, follow the instructions from your health care provider.  Monitor any changes in your symptoms. Tell your health care provider about any changes.  If instructed, monitor your blood pressure at home. Report changes as told by your health care provider.  Keep all follow-up visits. This is important.  Contact a health care provider if:  You have uncomfortable bladder contractions that you cannot control (spasms).  You leak urine with the spasms.  Get help right away if:  You have chills or a fever.  You have blood in your urine.  You have a catheter and the following happens:  Your catheter stops draining urine.  Your catheter falls out.  Summary  Acute urinary retention is a condition in which a person is unable to pass urine or can only pass a little urine. If left untreated, this condition can result in kidney damage or other serious complications.  An enlarged prostate may cause this condition. As men age, their prostate gland may become larger and may press or squeeze on the bladder or the urethra.  Treatment for this condition may include medicines and placement of an indwelling urinary catheter.  Monitor any changes in your symptoms. Tell your health care provider about any changes.  This information is not intended to replace advice given to you by your health care provider. Make sure you discuss any questions you have with your health care provider.      urology outpatient referral  you must take your prostate medications

## 2023-08-17 NOTE — ED PROVIDER NOTE - PROGRESS NOTE DETAILS
B[place]ster cath placed by RN-700cc urine obtained  Labs explained to pt  Pt with no abn renal function, feeling much better, will d/c home with leg bag.  Pt's already on BPH meds   outpt referral

## 2023-08-17 NOTE — ED PROVIDER NOTE - NSICDXPASTMEDICALHX_GEN_ALL_CORE_FT
PAST MEDICAL HISTORY:  CAD (coronary artery disease)     CVA (cerebrovascular accident)     Diabetes     Hypercholesterolemia     Hypertension     Neoplasm abdominal    Syncope and collapse 6/2015    TIA (transient ischemic attack) March 2015- was admitted at [facility]

## 2023-08-17 NOTE — ED PROVIDER NOTE - CLINICAL SUMMARY MEDICAL DECISION MAKING FREE TEXT BOX
s/p Lap estefania 2 days ago, now with oliguria to no urine output, concern for dehydration, urinary retention, will place durbin, get labs to r/o post obstructive uropathy, give IVF, reassess

## 2023-08-17 NOTE — ED PROVIDER NOTE - TEMPLATE
NP at pt's care facility calling re: long term antibiotics that Dr Mainor Domingo wanted to prescribe. Explained that would be the ID offices not urology. NP asking if a referral can be placed. Order placed.
 Symptoms

## 2023-08-17 NOTE — ED ADULT NURSE NOTE - OBJECTIVE STATEMENT
Pt presents to the ED with c/o inability to urinate since 7pm last night. Pt denies pain but with urge to urinate.

## 2023-08-17 NOTE — ED PROVIDER NOTE - OBJECTIVE STATEMENT
73-year-old male with diet-controlled diabetes.  Patient states he had lap estefania done on Tuesday with no complication, was discharged home on Tuesday.  Since his surgery, patient has been having oliguria, urinary frequency.  Patient now complaining of unable to urinate since 8 PM last night, suprapubic pressure, denies fever, N/V.  Last BM was 2 days ago prior to his lap estefania, pt with dec. appetite

## 2023-08-17 NOTE — ED ADULT NURSE NOTE - NSFALLUNIVINTERV_ED_ALL_ED
Bed/Stretcher in lowest position, wheels locked, appropriate side rails in place/Call bell, personal items and telephone in reach/Instruct patient to call for assistance before getting out of bed/chair/stretcher/Non-slip footwear applied when patient is off stretcher/[place] to call system/Physically safe environment - no spills, clutter or unnecessary equipment/Purposeful proactive rounding/Room/bathroom lighting operational, light cord in reach

## 2023-08-17 NOTE — ED PROVIDER NOTE - PATIENT PORTAL LINK FT
You can access the FollowMyHealth Patient Portal offered by [facility] by registering at the following website: [email or web address]. By joining [facility]’s FollowMyHealth portal, you will also be able to view your health information using other applications (apps) compatible with our system.

## 2023-08-18 LAB
CULTURE RESULTS: NO GROWTH — SIGNIFICANT CHANGE UP
SPECIMEN SOURCE: SIGNIFICANT CHANGE UP

## 2023-08-21 ENCOUNTER — APPOINTMENT (OUTPATIENT)
Age: 73
End: 2023-08-21
Payer: MEDICARE

## 2023-08-21 LAB — SURGICAL PATHOLOGY STUDY: SIGNIFICANT CHANGE UP

## 2023-08-21 PROCEDURE — 99024 POSTOP FOLLOW-UP VISIT: CPT

## 2023-08-21 NOTE — CONSULT LETTER
[Dear  ___] : Dear  [unfilled], [Consult Letter:] : I had the pleasure of evaluating your patient, [unfilled]. [Consult Closing:] : Thank you very much for allowing me to participate in the care of this patient.  If you have any questions, please do not hesitate to contact me. [Sincerely,] : Sincerely, [FreeTextEntry3] : Stone Brice MD

## 2023-08-21 NOTE — REASON FOR VISIT
[Post Op: _________] : a [unfilled] post op visit [FreeTextEntry1] : S/P Robotic-assisted laparoscopic cholecystectomy, 08/15/23

## 2023-08-21 NOTE — HISTORY OF PRESENT ILLNESS
[de-identified] : ASHLEE PEGUERO is a 73 y.o M, presents to the office for postoperative visit today, S/P Robotic-assisted laparoscopic cholecystectomy, 08/15/23. Patient had gone to [facility] ED 2/2 urinary retention, thursday evening, and had indwelling durbin catheter placed.

## 2023-08-24 ENCOUNTER — APPOINTMENT (OUTPATIENT)
Dept: UROLOGY | Facility: CLINIC | Age: 73
End: 2023-08-24
Payer: MEDICARE

## 2023-08-24 VITALS
TEMPERATURE: 98.1 F | DIASTOLIC BLOOD PRESSURE: 79 MMHG | WEIGHT: 144 LBS | HEIGHT: 64 IN | SYSTOLIC BLOOD PRESSURE: 139 MMHG | OXYGEN SATURATION: 97 % | BODY MASS INDEX: 24.59 KG/M2 | HEART RATE: 74 BPM

## 2023-08-24 PROCEDURE — 99204 OFFICE O/P NEW MOD 45 MIN: CPT

## 2023-08-29 NOTE — H&P ADULT - PROBLEM SELECTOR PLAN 6
PROCEDURES:  Right total knee replacement 29-Aug-2023 10:17:48  Fanny Andrew  
DVT ppx: sub q hep q8hrs, oob   PT c/s

## 2023-09-01 ENCOUNTER — APPOINTMENT (OUTPATIENT)
Dept: UROLOGY | Facility: CLINIC | Age: 73
End: 2023-09-01
Payer: MEDICARE

## 2023-09-01 VITALS
OXYGEN SATURATION: 95 % | HEART RATE: 75 BPM | WEIGHT: 144 LBS | BODY MASS INDEX: 24.59 KG/M2 | DIASTOLIC BLOOD PRESSURE: 62 MMHG | HEIGHT: 64 IN | TEMPERATURE: 97.7 F | SYSTOLIC BLOOD PRESSURE: 94 MMHG

## 2023-09-01 DIAGNOSIS — D41.4 NEOPLASM OF UNCERTAIN BEHAVIOR OF BLADDER: ICD-10-CM

## 2023-09-01 DIAGNOSIS — R33.9 RETENTION OF URINE, UNSPECIFIED: ICD-10-CM

## 2023-09-01 PROCEDURE — 99214 OFFICE O/P EST MOD 30 MIN: CPT

## 2023-09-01 NOTE — PHYSICAL EXAM
[General Appearance - Well Developed] : well developed [General Appearance - Well Nourished] : well nourished [Normal Appearance] : normal appearance [Well Groomed] : well groomed [General Appearance - In No Acute Distress] : no acute distress [Abdomen Soft] : soft [Abdomen Tenderness] : non-tender [Costovertebral Angle Tenderness] : no ~M costovertebral angle tenderness [Urethral Meatus] : meatus normal [Urinary Bladder Findings] : the bladder was normal on palpation [Scrotum] : the scrotum was normal [Testes Mass (___cm)] : there were no testicular masses [FreeTextEntry1] : durbin clear urine

## 2023-09-01 NOTE — PHYSICAL EXAM
[General Appearance - Well Developed] : well developed [General Appearance - Well Nourished] : well nourished [Normal Appearance] : normal appearance [Well Groomed] : well groomed [General Appearance - In No Acute Distress] : no acute distress [Edema] : no peripheral edema [Exaggerated Use Of Accessory Muscles For Inspiration] : no accessory muscle use [Respiration, Rhythm And Depth] : normal respiratory rhythm and effort [Abdomen Soft] : soft [Abdomen Tenderness] : non-tender [Costovertebral Angle Tenderness] : no ~M costovertebral angle tenderness [Urethral Meatus] : meatus normal [Urinary Bladder Findings] : the bladder was normal on palpation [Scrotum] : the scrotum was normal [Testes Mass (___cm)] : there were no testicular masses [FreeTextEntry1] : durbin clear urine  [Normal Station and Gait] : the gait and station were normal for the patient's age [] : no rash [No Focal Deficits] : no focal deficits

## 2023-09-01 NOTE — ASSESSMENT
[FreeTextEntry1] : pt nervous about removing cath again  does not want to learn cic  will f/u next week for cysto

## 2023-09-01 NOTE — ASSESSMENT
[FreeTextEntry1] : passed voiding trial  cont fiansteride tamsulosin  call for difficulty voiding  f/u check pvr

## 2023-09-01 NOTE — HISTORY OF PRESENT ILLNESS
[FreeTextEntry1] :  Chief Complaint: The patient is a 73y Male complaining of urinary retention. - HPI Objective Statement: 73-year-old male with diet-controlled diabetes. Patient states he had lap estefania done on Tuesday with no complication, was discharged home on Tuesday.  Since his surgery, patient has been having oliguria, urinary frequency.  Patient now complaining of unable to urinate since 8 PM last night, suprapubic pressure, denies fever, N/V.  Last BM was 2 days ago prior to his lap estefania, pt with dec. appetite - Presenting Symptoms: URINARY FREQUENCY, URINARY RETENTION - Negative Findings: no chills, no dysuria, no fever - Location: suprapubic region - Radiation: no radiation - Timing: gradual onset - Duration: day(s) - Quality: pressure - Severity: PAIN SCALE 8 OF 10. - Aggravated Factors: unable to urinate - Relieving Factors: none

## 2023-09-01 NOTE — HISTORY OF PRESENT ILLNESS
[FreeTextEntry1] :  Chief Complaint: The patient is a 73y Male complaining of urinary retention. - HPI Objective Statement: 73-year-old male with diet-controlled diabetes. Patient states he had lap estefania done on Tuesday with no complication, was discharged home on Tuesday.  Since his surgery, patient has been having oliguria, urinary frequency.  Patient now complaining of unable to urinate since 8 PM last night, suprapubic pressure, denies fever, N/V.  Last BM was 2 days ago prior to his lap estefania, pt with dec. appetite - Presenting Symptoms: URINARY FREQUENCY, URINARY RETENTION - Negative Findings: no chills, no dysuria, no fever - Location: suprapubic region - Radiation: no radiation - Timing: gradual onset - Duration: day(s) - Quality: pressure - Severity: PAIN SCALE 8 OF 10. - Aggravated Factors: unable to urinate - Relieving Factors: none  cath removerd last vist  voided well but that night had to have cath removed

## 2023-09-07 ENCOUNTER — APPOINTMENT (OUTPATIENT)
Dept: UROLOGY | Facility: CLINIC | Age: 73
End: 2023-09-07
Payer: MEDICARE

## 2023-09-07 VITALS
OXYGEN SATURATION: 97 % | HEIGHT: 64 IN | SYSTOLIC BLOOD PRESSURE: 124 MMHG | TEMPERATURE: 97.8 F | DIASTOLIC BLOOD PRESSURE: 79 MMHG | HEART RATE: 72 BPM | BODY MASS INDEX: 24.59 KG/M2 | WEIGHT: 144 LBS

## 2023-09-07 DIAGNOSIS — N40.1 BENIGN PROSTATIC HYPERPLASIA WITH LOWER URINARY TRACT SYMPMS: ICD-10-CM

## 2023-09-07 DIAGNOSIS — R33.8 BENIGN PROSTATIC HYPERPLASIA WITH LOWER URINARY TRACT SYMPMS: ICD-10-CM

## 2023-09-07 PROCEDURE — 52000 CYSTOURETHROSCOPY: CPT

## 2023-10-12 ENCOUNTER — APPOINTMENT (OUTPATIENT)
Dept: UROLOGY | Facility: CLINIC | Age: 73
End: 2023-10-12

## 2024-03-28 NOTE — ASSESSMENT
[FreeTextEntry1] : We discussed the nature of gall bladder polyps, and the need for resection of them.  He will follow up with Dr. Brice for his cholecystectomy, and follow up with us thereafter, if needed. [Large Joint Injection] : Large joint injection [Right] : of the right [Knee] : knee [Pain] : pain [X-ray evidence of Osteoarthritis on this or prior visit] : x-ray evidence of Osteoarthritis on this or prior visit [Betadine] : betadine [___ cc    1%] : Lidocaine ~Vcc of 1%  [Ethyl Chloride sprayed topically] : ethyl chloride sprayed topically [] : Patient tolerated procedure well [___ cc    40mg] : Methylprednisolone (Depomedrol) ~Vcc of 40 mg  [Apply ice for 15min out of every hour for the next 12-24 hours as tolerated] : apply ice for 15 minutes out of every hour for the next 12-24 hours as tolerated [Call if redness, pain or fever occur] : call if redness, pain or fever occur [Risks, benefits, alternatives discussed / Verbal consent obtained] : the risks benefits, and alternatives have been discussed, and verbal consent was obtained

## 2024-08-21 NOTE — OCCUPATIONAL THERAPY INITIAL EVALUATION ADULT - NS ASR WT BEARING DETAIL RUE
Detail Level: Detailed
Depth Of Biopsy: dermis
weight-bearing as tolerated
Was A Bandage Applied: Yes
Size Of Lesion In Cm: 0
Biopsy Type: H and E
Biopsy Method: Dermablade
Anesthesia Type: 2% lidocaine with epinephrine and a 1:10 solution of 8.4% sodium bicarbonate
Anesthesia Volume In Cc: 0.5
Hemostasis: Drysol
Wound Care: Mupirocin
Dressing: bandage
Destruction After The Procedure: No
Type Of Destruction Used: Curettage
Curettage Text: The wound bed was treated with curettage after the biopsy was performed.
Cryotherapy Text: The wound bed was treated with cryotherapy after the biopsy was performed.
Electrodesiccation Text: The wound bed was treated with electrodesiccation after the biopsy was performed.
Electrodesiccation And Curettage Text: The wound bed was treated with electrodesiccation and curettage after the biopsy was performed.
Silver Nitrate Text: The wound bed was treated with silver nitrate after the biopsy was performed.
Lab: [id number]
Lab Facility: [id number]
Consent: Written consent was obtained and risks were reviewed including but not limited to scarring, infection, bleeding, scabbing, incomplete removal, nerve damage and allergy to anesthesia.
Post-Care Instructions: I reviewed with the patient in detail post-care instructions. Patient is to keep the biopsy site dry overnight, and then apply bacitracin twice daily until healed. Patient may apply hydrogen peroxide soaks to remove any crusting.
Notification Instructions: Patient will be notified of biopsy results. However, patient instructed to call the office if not contacted within 2 weeks.
Billing Type: Third-Party Bill
Information: Selecting Yes will display possible errors in your note based on the variables you have selected. This validation is only offered as a suggestion for you. PLEASE NOTE THAT THE VALIDATION TEXT WILL BE REMOVED WHEN YOU FINALIZE YOUR NOTE. IF YOU WANT TO FAX A PRELIMINARY NOTE YOU WILL NEED TO TOGGLE THIS TO 'NO' IF YOU DO NOT WANT IT IN YOUR FAXED NOTE.

## 2024-08-23 NOTE — ED ADULT TRIAGE NOTE - CHIEF COMPLAINT QUOTE
[Joint Pain] : joint pain [Joint Swelling] : joint swelling [Negative] : Heme/Lymph c/o unable to urinate since last night

## 2024-10-01 NOTE — H&P PST ADULT - NEGATIVE ALLERGIC REACTIONS
Anticoagulation Progress Note    Indication: POLYCYTHEMIA VERA  Goal INR: 2.0-3.0  Duration: Indefinite  Referring Provider: Jose Antonio Viveros  Supervising Provider:Mo Brannon Expiration Date:6/27/2025  Pertinent History: DM                               PORTAL VEIN THROMBOSIS    Assessment  Yes No   []  [x] Missed doses:   []  [x] Extra doses:   []  [x] Significant medication changes (RX, OTC, Herbal):   []  [x] High-risk maintenance medications:                []  [x] Vitamin K / dietary changes (Vitamin K goal: ___ cups/week):   []  [x] Bleeding / bruising:   []  [x] Falls / injury:   []  [x] Acute illness:    []  [x] Alcohol intake:   []  [x] Procedures / hospitalization / ER visits:   []  [x] Other:    Plan (6mg tablet-1.5 tabs a day  INR Result: 2.8  The INR result for today is therapeutic based on the INR goal 2.0-3.0.  Etiology: NA  Recommended Dose: Instructed pt to continue same dose 9mg q daily.     Follow-Up:  10/29/24 with Dr Jose Antonio Macias - annual physical w/ INR check  Comments: Discussed with pt about the medication, S/E , VIT K in diet, OTC medication. Handouts given to pt. Pt has been taking warfarin 9mg for the past 4 months. Advise pt any bleeding to go to ER.  Pt was our coumadin patient 2020 but his insurance changed and saw a different provider.  I'm been taking warfarin for 12 years.  No bleeding.  VS TAKEN /78, HR 62    Counseling  Counseled about signs/symptoms of bruising/bleeding and appropriate management  Counseled about prevention and management of nosebleeds  Counseled about signs/symptoms of thrombosis and/or stroke and when to seek emergent care  Stressed importance of compliance with exact recommended dosage regimen  Stressed importance of compliance with consistent vitamin K intake  Discussed measures to reduce risk for falls and appropriate action when serious injury occurs  Strongly advised to limit/avoid alcohol  consumption  Stressed importance of adherence with recommended lab monitoring  Instructed to notify clinic about medication changes or health status changes  Instructed to notify clinic about scheduled procedures  Discussed risk of thrombosis and/or bleeding with inappropriate anticoagulant use and monitoring frequency  Will provide when INR management begins (current note for documentation purposes only)    Provided patient/caregiver with written and verbal dosing instructions, patient/caregiver verbalized understanding.       Aurora Marr RN  10/01/2024     no anaphylaxis/no respiratory distress

## 2025-01-15 ENCOUNTER — EMERGENCY (EMERGENCY)
Facility: HOSPITAL | Age: 75
LOS: 1 days | Discharge: HOME | End: 2025-01-15
Attending: STUDENT IN AN ORGANIZED HEALTH CARE EDUCATION/TRAINING PROGRAM | Admitting: STUDENT IN AN ORGANIZED HEALTH CARE EDUCATION/TRAINING PROGRAM
Payer: MEDICARE

## 2025-01-15 VITALS
SYSTOLIC BLOOD PRESSURE: 118 MMHG | HEART RATE: 99 BPM | TEMPERATURE: 100 F | OXYGEN SATURATION: 99 % | DIASTOLIC BLOOD PRESSURE: 72 MMHG | RESPIRATION RATE: 18 BRPM

## 2025-01-15 VITALS
HEART RATE: 94 BPM | OXYGEN SATURATION: 100 % | TEMPERATURE: 99 F | SYSTOLIC BLOOD PRESSURE: 137 MMHG | RESPIRATION RATE: 17 BRPM | DIASTOLIC BLOOD PRESSURE: 80 MMHG

## 2025-01-15 LAB
ALBUMIN SERPL ELPH-MCNC: 4.3 G/DL — SIGNIFICANT CHANGE UP (ref 3.3–5)
ALP SERPL-CCNC: 89 U/L — SIGNIFICANT CHANGE UP (ref 40–120)
ALT FLD-CCNC: 12 U/L — SIGNIFICANT CHANGE UP (ref 4–41)
ANION GAP SERPL CALC-SCNC: 16 MMOL/L — HIGH (ref 7–14)
APTT BLD: 31.6 SEC — SIGNIFICANT CHANGE UP (ref 24.5–35.6)
AST SERPL-CCNC: 16 U/L — SIGNIFICANT CHANGE UP (ref 4–40)
BASOPHILS # BLD AUTO: 0.05 K/UL — SIGNIFICANT CHANGE UP (ref 0–0.2)
BASOPHILS NFR BLD AUTO: 0.7 % — SIGNIFICANT CHANGE UP (ref 0–2)
BILIRUB SERPL-MCNC: 0.7 MG/DL — SIGNIFICANT CHANGE UP (ref 0.2–1.2)
BUN SERPL-MCNC: 30 MG/DL — HIGH (ref 7–23)
CALCIUM SERPL-MCNC: 9 MG/DL — SIGNIFICANT CHANGE UP (ref 8.4–10.5)
CHLORIDE SERPL-SCNC: 100 MMOL/L — SIGNIFICANT CHANGE UP (ref 98–107)
CO2 SERPL-SCNC: 21 MMOL/L — LOW (ref 22–31)
CREAT SERPL-MCNC: 1.4 MG/DL — HIGH (ref 0.5–1.3)
EGFR: 52 ML/MIN/1.73M2 — LOW
EGFR: 52 ML/MIN/1.73M2 — LOW
EOSINOPHIL # BLD AUTO: 0.02 K/UL — SIGNIFICANT CHANGE UP (ref 0–0.5)
EOSINOPHIL NFR BLD AUTO: 0.3 % — SIGNIFICANT CHANGE UP (ref 0–6)
FLUAV AG NPH QL: DETECTED
FLUBV AG NPH QL: SIGNIFICANT CHANGE UP
GLUCOSE SERPL-MCNC: 112 MG/DL — HIGH (ref 70–99)
HCT VFR BLD CALC: 42.3 % — SIGNIFICANT CHANGE UP (ref 39–50)
HGB BLD-MCNC: 13.4 G/DL — SIGNIFICANT CHANGE UP (ref 13–17)
IANC: 5.98 K/UL — SIGNIFICANT CHANGE UP (ref 1.8–7.4)
IMM GRANULOCYTES NFR BLD AUTO: 0.3 % — SIGNIFICANT CHANGE UP (ref 0–0.9)
INR BLD: 1.03 RATIO — SIGNIFICANT CHANGE UP (ref 0.85–1.16)
LYMPHOCYTES # BLD AUTO: 0.74 K/UL — LOW (ref 1–3.3)
LYMPHOCYTES # BLD AUTO: 9.8 % — LOW (ref 13–44)
MCHC RBC-ENTMCNC: 25.9 PG — LOW (ref 27–34)
MCHC RBC-ENTMCNC: 31.7 G/DL — LOW (ref 32–36)
MCV RBC AUTO: 81.7 FL — SIGNIFICANT CHANGE UP (ref 80–100)
MONOCYTES # BLD AUTO: 0.73 K/UL — SIGNIFICANT CHANGE UP (ref 0–0.9)
MONOCYTES NFR BLD AUTO: 9.7 % — SIGNIFICANT CHANGE UP (ref 2–14)
NEUTROPHILS # BLD AUTO: 5.98 K/UL — SIGNIFICANT CHANGE UP (ref 1.8–7.4)
NEUTROPHILS NFR BLD AUTO: 79.2 % — HIGH (ref 43–77)
NRBC # BLD AUTO: 0 K/UL — SIGNIFICANT CHANGE UP (ref 0–0)
NRBC # BLD: 0 /100 WBCS — SIGNIFICANT CHANGE UP (ref 0–0)
NRBC # FLD: 0 K/UL — SIGNIFICANT CHANGE UP (ref 0–0)
NRBC BLD-RTO: 0 /100 WBCS — SIGNIFICANT CHANGE UP (ref 0–0)
PLATELET # BLD AUTO: 255 K/UL — SIGNIFICANT CHANGE UP (ref 150–400)
POTASSIUM SERPL-MCNC: 4.1 MMOL/L — SIGNIFICANT CHANGE UP (ref 3.5–5.3)
POTASSIUM SERPL-SCNC: 4.1 MMOL/L — SIGNIFICANT CHANGE UP (ref 3.5–5.3)
PROT SERPL-MCNC: 7.3 G/DL — SIGNIFICANT CHANGE UP (ref 6–8.3)
PROTHROM AB SERPL-ACNC: 12 SEC — SIGNIFICANT CHANGE UP (ref 9.9–13.4)
RBC # BLD: 5.18 M/UL — SIGNIFICANT CHANGE UP (ref 4.2–5.8)
RBC # FLD: 14.6 % — HIGH (ref 10.3–14.5)
RSV RNA NPH QL NAA+NON-PROBE: SIGNIFICANT CHANGE UP
SARS-COV-2 RNA SPEC QL NAA+PROBE: SIGNIFICANT CHANGE UP
SODIUM SERPL-SCNC: 137 MMOL/L — SIGNIFICANT CHANGE UP (ref 135–145)
TROPONIN T, HIGH SENSITIVITY RESULT: 20 NG/L — SIGNIFICANT CHANGE UP
WBC # BLD: 7.54 K/UL — SIGNIFICANT CHANGE UP (ref 3.8–10.5)
WBC # FLD AUTO: 7.54 K/UL — SIGNIFICANT CHANGE UP (ref 3.8–10.5)

## 2025-01-15 PROCEDURE — 99291 CRITICAL CARE FIRST HOUR: CPT

## 2025-01-15 PROCEDURE — 70498 CT ANGIOGRAPHY NECK: CPT | Mod: 26

## 2025-01-15 PROCEDURE — 70496 CT ANGIOGRAPHY HEAD: CPT | Mod: 26

## 2025-01-15 PROCEDURE — 70450 CT HEAD/BRAIN W/O DYE: CPT | Mod: 26,XU

## 2025-01-15 PROCEDURE — 0042T: CPT

## 2025-01-15 RX ADMIN — Medication 1000 MILLILITER(S): at 21:04

## (undated) DEVICE — D HELP - CLEARVIEW CLEARIFY SYSTEM

## (undated) DEVICE — XI SEAL UNIV 5- 8 MM

## (undated) DEVICE — XI ARM PERMANENT CAUTERY HOOK

## (undated) DEVICE — SUT MONOCRYL 4-0 27" PS-2 UNDYED

## (undated) DEVICE — ENDOCATCH 10MM SPECIMEN POUCH

## (undated) DEVICE — DRAPE XL SHEET 77X98"

## (undated) DEVICE — SUT VICRYL 0 27" CT-2 UNDYED

## (undated) DEVICE — XI OBTURATOR OPTICAL BLADELESS 8MM

## (undated) DEVICE — DRAPE TOWEL BLUE STICKY

## (undated) DEVICE — Device

## (undated) DEVICE — CLEANER FOR ELCTR TIP

## (undated) DEVICE — XI DRAPE COLUMN

## (undated) DEVICE — ELCTR BOVIE BLADE 3/4" EXTENDED LENGTH 6"

## (undated) DEVICE — TUBING CONNECTING 6MM 20FT

## (undated) DEVICE — TROCAR SURGIQUEST AIRSEAL 12MMX100MM

## (undated) DEVICE — SUT POLYSORB 0 30" GU-46

## (undated) DEVICE — PACK ROBOTIC